# Patient Record
Sex: FEMALE | Race: WHITE | Employment: OTHER | URBAN - METROPOLITAN AREA
[De-identification: names, ages, dates, MRNs, and addresses within clinical notes are randomized per-mention and may not be internally consistent; named-entity substitution may affect disease eponyms.]

---

## 2021-01-17 ENCOUNTER — APPOINTMENT (OUTPATIENT)
Dept: GENERAL RADIOLOGY | Age: 86
DRG: 884 | End: 2021-01-17
Payer: MEDICARE

## 2021-01-17 ENCOUNTER — APPOINTMENT (OUTPATIENT)
Dept: CT IMAGING | Age: 86
DRG: 884 | End: 2021-01-17
Payer: MEDICARE

## 2021-01-17 ENCOUNTER — HOSPITAL ENCOUNTER (INPATIENT)
Age: 86
LOS: 2 days | Discharge: HOME HEALTH | DRG: 884 | End: 2021-01-19
Attending: INTERNAL MEDICINE | Admitting: INTERNAL MEDICINE
Payer: MEDICARE

## 2021-01-17 DIAGNOSIS — M25.512 ACUTE PAIN OF LEFT SHOULDER: ICD-10-CM

## 2021-01-17 DIAGNOSIS — R41.82 ALTERED MENTAL STATUS, UNSPECIFIED ALTERED MENTAL STATUS TYPE: Primary | ICD-10-CM
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
## 2021-01-17 DIAGNOSIS — R53.1 GENERAL WEAKNESS: ICD-10-CM

## 2021-01-17 PROBLEM — F32.5 MAJOR DEPRESSIVE DISORDER IN FULL REMISSION (HCC): Status: ACTIVE | Noted: 2019-08-14

## 2021-01-17 PROBLEM — R29.6 FREQUENT FALLS: Status: ACTIVE | Noted: 2021-01-17

## 2021-01-17 PROBLEM — F02.80 ALZHEIMER'S DEMENTIA WITHOUT BEHAVIORAL DISTURBANCE (HCC): Status: ACTIVE | Noted: 2019-08-14

## 2021-01-17 PROBLEM — I73.9 PVD (PERIPHERAL VASCULAR DISEASE) (HCC): Status: ACTIVE | Noted: 2021-01-17

## 2021-01-17 PROBLEM — G30.9 ALZHEIMER'S DEMENTIA WITHOUT BEHAVIORAL DISTURBANCE (HCC): Status: ACTIVE | Noted: 2019-08-14

## 2021-01-17 PROBLEM — I10 ESSENTIAL HYPERTENSION: Status: ACTIVE | Noted: 2017-03-10

## 2021-01-17 LAB
ABO/RH: NORMAL
ALBUMIN SERPL-MCNC: 4.3 G/DL (ref 3.5–4.6)
ALP BLD-CCNC: 92 U/L (ref 40–130)
ALT SERPL-CCNC: 16 U/L (ref 0–33)
ANION GAP SERPL CALCULATED.3IONS-SCNC: 13 MEQ/L (ref 9–15)
ANTIBODY SCREEN: NORMAL
AST SERPL-CCNC: 28 U/L (ref 0–35)
BACTERIA: NEGATIVE /HPF
BASOPHILS ABSOLUTE: 0 K/UL (ref 0–0.2)
BASOPHILS RELATIVE PERCENT: 0.3 %
BILIRUB SERPL-MCNC: 0.7 MG/DL (ref 0.2–0.7)
BILIRUBIN URINE: NEGATIVE
BLOOD, URINE: ABNORMAL
BUN BLDV-MCNC: 18 MG/DL (ref 8–23)
CALCIUM SERPL-MCNC: 9.6 MG/DL (ref 8.5–9.9)
CHLORIDE BLD-SCNC: 93 MEQ/L (ref 95–107)
CLARITY: CLEAR
CO2: 26 MEQ/L (ref 20–31)
COLOR: YELLOW
CREAT SERPL-MCNC: 0.85 MG/DL (ref 0.5–0.9)
EOSINOPHILS ABSOLUTE: 0 K/UL (ref 0–0.7)
EOSINOPHILS RELATIVE PERCENT: 0.5 %
EPITHELIAL CELLS, UA: ABNORMAL /HPF (ref 0–5)
GFR AFRICAN AMERICAN: >60
GFR NON-AFRICAN AMERICAN: >60
GLOBULIN: 2.8 G/DL (ref 2.3–3.5)
GLUCOSE BLD-MCNC: 142 MG/DL (ref 70–99)
GLUCOSE URINE: NEGATIVE MG/DL
HCT VFR BLD CALC: 41.8 % (ref 37–47)
HEMOGLOBIN: 13.9 G/DL (ref 12–16)
HYALINE CASTS: ABNORMAL /HPF (ref 0–5)
INR BLD: 0.9
KETONES, URINE: 15 MG/DL
LEUKOCYTE ESTERASE, URINE: NEGATIVE
LYMPHOCYTES ABSOLUTE: 0.9 K/UL (ref 1–4.8)
LYMPHOCYTES RELATIVE PERCENT: 9.7 %
MAGNESIUM: 2 MG/DL (ref 1.7–2.4)
MCH RBC QN AUTO: 32.7 PG (ref 27–31.3)
MCHC RBC AUTO-ENTMCNC: 33.3 % (ref 33–37)
MCV RBC AUTO: 98.2 FL (ref 82–100)
MONOCYTES ABSOLUTE: 0.5 K/UL (ref 0.2–0.8)
MONOCYTES RELATIVE PERCENT: 5.4 %
NEUTROPHILS ABSOLUTE: 7.6 K/UL (ref 1.4–6.5)
NEUTROPHILS RELATIVE PERCENT: 84.1 %
NITRITE, URINE: NEGATIVE
PDW BLD-RTO: 13.2 % (ref 11.5–14.5)
PH UA: 5.5 (ref 5–9)
PLATELET # BLD: 172 K/UL (ref 130–400)
POTASSIUM SERPL-SCNC: 4.8 MEQ/L (ref 3.4–4.9)
PRO-BNP: 405 PG/ML
PROTEIN UA: ABNORMAL MG/DL
PROTHROMBIN TIME: 11.8 SEC (ref 12.3–14.9)
RBC # BLD: 4.26 M/UL (ref 4.2–5.4)
RBC UA: ABNORMAL /HPF (ref 0–5)
SARS-COV-2, NAAT: NOT DETECTED
SODIUM BLD-SCNC: 132 MEQ/L (ref 135–144)
SPECIFIC GRAVITY UA: 1.04 (ref 1–1.03)
TOTAL CK: 164 U/L (ref 0–170)
TOTAL PROTEIN: 7.1 G/DL (ref 6.3–8)
TROPONIN: <0.01 NG/ML (ref 0–0.01)
URINE REFLEX TO CULTURE: ABNORMAL
UROBILINOGEN, URINE: 0.2 E.U./DL
WBC # BLD: 9 K/UL (ref 4.8–10.8)
WBC UA: ABNORMAL /HPF (ref 0–5)

## 2021-01-17 PROCEDURE — 82550 ASSAY OF CK (CPK): CPT

## 2021-01-17 PROCEDURE — 81001 URINALYSIS AUTO W/SCOPE: CPT

## 2021-01-17 PROCEDURE — 96376 TX/PRO/DX INJ SAME DRUG ADON: CPT

## 2021-01-17 PROCEDURE — 36415 COLL VENOUS BLD VENIPUNCTURE: CPT

## 2021-01-17 PROCEDURE — 86850 RBC ANTIBODY SCREEN: CPT

## 2021-01-17 PROCEDURE — 84484 ASSAY OF TROPONIN QUANT: CPT

## 2021-01-17 PROCEDURE — 86901 BLOOD TYPING SEROLOGIC RH(D): CPT

## 2021-01-17 PROCEDURE — 96375 TX/PRO/DX INJ NEW DRUG ADDON: CPT

## 2021-01-17 PROCEDURE — 80053 COMPREHEN METABOLIC PANEL: CPT

## 2021-01-17 PROCEDURE — 72125 CT NECK SPINE W/O DYE: CPT

## 2021-01-17 PROCEDURE — 83735 ASSAY OF MAGNESIUM: CPT

## 2021-01-17 PROCEDURE — 72128 CT CHEST SPINE W/O DYE: CPT

## 2021-01-17 PROCEDURE — 96374 THER/PROPH/DIAG INJ IV PUSH: CPT

## 2021-01-17 PROCEDURE — 6360000004 HC RX CONTRAST MEDICATION: Performed by: NURSE PRACTITIONER

## 2021-01-17 PROCEDURE — 73080 X-RAY EXAM OF ELBOW: CPT

## 2021-01-17 PROCEDURE — 86900 BLOOD TYPING SEROLOGIC ABO: CPT

## 2021-01-17 PROCEDURE — 1210000000 HC MED SURG R&B

## 2021-01-17 PROCEDURE — U0002 COVID-19 LAB TEST NON-CDC: HCPCS

## 2021-01-17 PROCEDURE — 85025 COMPLETE CBC W/AUTO DIFF WBC: CPT

## 2021-01-17 PROCEDURE — 93005 ELECTROCARDIOGRAM TRACING: CPT | Performed by: NURSE PRACTITIONER

## 2021-01-17 PROCEDURE — 85610 PROTHROMBIN TIME: CPT

## 2021-01-17 PROCEDURE — 2580000003 HC RX 258: Performed by: NURSE PRACTITIONER

## 2021-01-17 PROCEDURE — 73030 X-RAY EXAM OF SHOULDER: CPT

## 2021-01-17 PROCEDURE — 72131 CT LUMBAR SPINE W/O DYE: CPT

## 2021-01-17 PROCEDURE — 70450 CT HEAD/BRAIN W/O DYE: CPT

## 2021-01-17 PROCEDURE — 6360000002 HC RX W HCPCS: Performed by: NURSE PRACTITIONER

## 2021-01-17 PROCEDURE — 74177 CT ABD & PELVIS W/CONTRAST: CPT

## 2021-01-17 PROCEDURE — 83880 ASSAY OF NATRIURETIC PEPTIDE: CPT

## 2021-01-17 PROCEDURE — 71260 CT THORAX DX C+: CPT

## 2021-01-17 PROCEDURE — 99284 EMERGENCY DEPT VISIT MOD MDM: CPT

## 2021-01-17 PROCEDURE — 2500000003 HC RX 250 WO HCPCS: Performed by: NURSE PRACTITIONER

## 2021-01-17 RX ORDER — FENTANYL CITRATE 50 UG/ML
50 INJECTION, SOLUTION INTRAMUSCULAR; INTRAVENOUS
Status: COMPLETED | OUTPATIENT
Start: 2021-01-17 | End: 2021-01-17

## 2021-01-17 RX ORDER — LABETALOL HYDROCHLORIDE 5 MG/ML
10 INJECTION, SOLUTION INTRAVENOUS ONCE
Status: COMPLETED | OUTPATIENT
Start: 2021-01-17 | End: 2021-01-17

## 2021-01-17 RX ORDER — METOPROLOL TARTRATE 5 MG/5ML
5 INJECTION INTRAVENOUS ONCE
Status: DISCONTINUED | OUTPATIENT
Start: 2021-01-17 | End: 2021-01-17

## 2021-01-17 RX ORDER — ONDANSETRON 2 MG/ML
4 INJECTION INTRAMUSCULAR; INTRAVENOUS ONCE
Status: COMPLETED | OUTPATIENT
Start: 2021-01-17 | End: 2021-01-17

## 2021-01-17 RX ORDER — SODIUM CHLORIDE 9 MG/ML
INJECTION, SOLUTION INTRAVENOUS CONTINUOUS
Status: DISCONTINUED | OUTPATIENT
Start: 2021-01-17 | End: 2021-01-18

## 2021-01-17 RX ADMIN — IOPAMIDOL 100 ML: 612 INJECTION, SOLUTION INTRAVENOUS at 20:23

## 2021-01-17 RX ADMIN — FENTANYL CITRATE 50 MCG: 50 INJECTION, SOLUTION INTRAMUSCULAR; INTRAVENOUS at 20:20

## 2021-01-17 RX ADMIN — LABETALOL HYDROCHLORIDE 10 MG: 5 INJECTION, SOLUTION INTRAVENOUS at 21:56

## 2021-01-17 RX ADMIN — LABETALOL HYDROCHLORIDE 10 MG: 5 INJECTION, SOLUTION INTRAVENOUS at 21:17

## 2021-01-17 RX ADMIN — FENTANYL CITRATE 50 MCG: 50 INJECTION, SOLUTION INTRAMUSCULAR; INTRAVENOUS at 22:24

## 2021-01-17 RX ADMIN — ONDANSETRON 4 MG: 2 INJECTION INTRAMUSCULAR; INTRAVENOUS at 18:42

## 2021-01-17 RX ADMIN — FENTANYL CITRATE 50 MCG: 50 INJECTION, SOLUTION INTRAMUSCULAR; INTRAVENOUS at 19:30

## 2021-01-17 RX ADMIN — FENTANYL CITRATE 50 MCG: 50 INJECTION, SOLUTION INTRAMUSCULAR; INTRAVENOUS at 23:02

## 2021-01-17 RX ADMIN — FENTANYL CITRATE 50 MCG: 50 INJECTION, SOLUTION INTRAMUSCULAR; INTRAVENOUS at 18:42

## 2021-01-17 RX ADMIN — SODIUM CHLORIDE: 9 INJECTION, SOLUTION INTRAVENOUS at 18:42

## 2021-01-17 RX ADMIN — FENTANYL CITRATE 50 MCG: 50 INJECTION, SOLUTION INTRAMUSCULAR; INTRAVENOUS at 21:18

## 2021-01-17 ASSESSMENT — PAIN SCALES - GENERAL
PAINLEVEL_OUTOF10: 9
PAINLEVEL_OUTOF10: 8
PAINLEVEL_OUTOF10: 9
PAINLEVEL_OUTOF10: 10

## 2021-01-17 ASSESSMENT — ENCOUNTER SYMPTOMS
RHINORRHEA: 0
CHEST TIGHTNESS: 0
EYE REDNESS: 0
SINUS PRESSURE: 0
DIARRHEA: 0
NAUSEA: 0
PHOTOPHOBIA: 0
WHEEZING: 0
VOMITING: 0
BACK PAIN: 0
COUGH: 0
SINUS PAIN: 0
TROUBLE SWALLOWING: 0
SHORTNESS OF BREATH: 0
SORE THROAT: 0
COLOR CHANGE: 0
ABDOMINAL PAIN: 0
EYE PAIN: 0

## 2021-01-17 ASSESSMENT — PAIN DESCRIPTION - ORIENTATION: ORIENTATION: LEFT

## 2021-01-17 NOTE — ED TRIAGE NOTES
PT ALERT AND FORGETFUL. PT IS ON ra NO DISTRESS NOTED. PT C/O PAIN 10/10 IN LEFT SHOULDER.  PT FELL AT HOME AND NOT SURE IF SHE HAD loc

## 2021-01-17 NOTE — ED PROVIDER NOTES
Guanakito Gregorio is a 80 y.o. female who presents to the emergency department for complaint of unwitnessed fall with unknown loss of consciousness or downtime. Patient is complaining of left-sided shoulder upper arm and left-sided chest pain. She states that she does not know how she fell and is not sure even if she did fall. She does not recall a short portion of the afternoon she does not know how she injured her arm. Her daughter is bedside and states that her mother contacted the granddaughter but hung up and was able to get additional formation. They drove to the house and found her able to stand but complained of severe left arm pain appeared to be slightly confused. Daughter states she does have a history of some Alzheimer's dementia but is generally able to care for herself. She does admit that she is had a history of other multiple falls and TIAs in the past.  She states that she appears to be at her baseline level of orientation when she found her but was concerned due to the obvious discomfort. The patient rates the pain in the left arm 10 out of 10 sharp aching stabbing made worse with any pressure to the arm or attempts to move her arm. She states she does have some increased pain with deep breathing on the left side of the chest.  She denies any head or neck pain at this time denies any midline back pain denies abdominal pain. She denies any recent illnesses. She denies any urinary symptoms of urgency frequency or dysuria. She states that she is uncertain if she took any of her medications and is not sure what time the date is. She states she generally takes them in early afternoon but cannot confirm that she is taking any of her medications today. Her daughter states that she believes she is on Plavix. Nursing Notes were reviewed.     REVIEW OF SYSTEMS    (2-9 systems for level 4, 10 or more for level 5)     Review of Systems Constitutional: Negative for activity change, appetite change, chills, diaphoresis, fatigue and fever. HENT: Negative for congestion, ear pain, postnasal drip, rhinorrhea, sinus pressure, sinus pain, sore throat and trouble swallowing. Eyes: Negative for photophobia, pain, redness and visual disturbance. Respiratory: Negative for cough, chest tightness, shortness of breath and wheezing. Cardiovascular: Positive for chest pain. Negative for palpitations and leg swelling. Gastrointestinal: Negative for abdominal pain, diarrhea, nausea and vomiting. Genitourinary: Negative for difficulty urinating, dysuria, flank pain, frequency and urgency. Musculoskeletal: Positive for arthralgias and myalgias. Negative for back pain, gait problem, joint swelling, neck pain and neck stiffness. Skin: Negative for color change and rash. Neurological: Negative for dizziness, tremors, seizures, syncope, speech difficulty, weakness, light-headedness, numbness and headaches. Except as noted above the remainder of the review of systems was reviewed and negative. PAST MEDICAL HISTORY     Past Medical History:   Diagnosis Date    Hypertension     TIA (transient ischemic attack)     Uterine cancer ([facility] [address]      Past Surgical History:   Procedure Laterality Date    FRACTURE SURGERY      JOINT REPLACEMENT       Social History     Socioeconomic History    Marital status:       Spouse name: None    Number of children: None    Years of education: None    Highest education level: None   Occupational History    None   Social Needs    Financial resource strain: None    Food insecurity     Worry: None     Inability: None    Transportation needs     Medical: None     Non-medical: None   Tobacco Use    Smoking status: Never Smoker    Smokeless tobacco: Never Used   Substance and Sexual Activity    Alcohol use: Not Currently    Drug use: Not Currently    Sexual activity: Not Currently   Lifestyle General: Bowel sounds are normal. There is no distension. Palpations: Abdomen is soft. There is no mass. Tenderness: There is no abdominal tenderness. There is no guarding or rebound. Hernia: No hernia is present. Musculoskeletal:         General: Swelling, tenderness and signs of injury present. No deformity. Right shoulder: Normal.      Left shoulder: She exhibits decreased range of motion, tenderness, bony tenderness, pain, spasm and decreased strength. She exhibits no swelling and no effusion. Right elbow: Normal.     Left elbow: Normal.      Right wrist: Normal.      Left wrist: Normal.      Right hip: Normal.      Left hip: Normal.      Right knee: Normal.      Left knee: Normal.      Cervical back: Normal.      Thoracic back: Normal.      Lumbar back: Normal.      Right hand: Normal.      Left hand: Normal.   Skin:     General: Skin is warm and dry. Neurological:      General: No focal deficit present. Mental Status: She is alert and oriented to person, place, and time. Mental status is at baseline. Cranial Nerves: No cranial nerve deficit. Sensory: No sensory deficit. Motor: No weakness.       Coordination: Coordination normal.         RESULTS     EKG: All EKG's are interpreted by the Emergency Department Physician who either signs or Co-signsthis chart in the absence of a cardiologist.    Sinus rhythm with PACs aberrant conduction left axis deviation 96 bpm no apparent ST elevation  ms    RADIOLOGY:   Non-plain filmimages such as CT, Ultrasound and MRI are read by the radiologist. Plain radiographic images are visualized and preliminarily interpreted by the emergency physician with the below findings:    2 view x-ray of the left shoulder 4 view x-ray of the left elbow showed no acute fracture displacement or obvious bony process    Interpretation per the Radiologist below, if available at the time ofthis note:    [address]   Final Result NO ACUTE INTRA-AXIAL OR EXTRA-AXIAL FINDINGS. All CT scans at this facility use dose modulation, iterative reconstruction, and/or weight based dosing when appropriate to reduce radiation dose to as low as reasonably achievable. CT CERVICAL SPINE WO CONTRAST   Final Result      CT THORACIC SPINE WO CONTRAST   Final Result      NO ACUTE FRACTURES. All CT scans at this facility use dose modulation, iterative reconstruction, and/or weight based dosing when appropriate to reduce radiation dose to as low as reasonably achievable. Examination: CT scan lumbar spine      CLINICAL DATA: Pain, fall      TECHNIQUE: Multiple serial axial images of the lumbar spine from the lower thoracic through the sacral/coccygeal levels with both sagittal coronal reconstruction was performed      FINDINGS:   There are 5 lumbar type vertebra. Is a diffuse generalized osteopenia. The disc spaces are narrowed at the L1-2 and L5-S1 disc space. .   There is a grade 1 anterolisthesis of L4 and L5. Gualberto Kick There is multilevel degenerative changes of posterior facets of lower lumbar spine      The SI joints are intact. Approximately 20% loss of height of the L2 vertebra. Likely old   No new acute fractures. IMPRESSION:      NO ACUTE FRACTURES   GRADE 1 ANTEROLISTHESIS OF L4 on L5      CT LUMBAR SPINE WO CONTRAST   Final Result      NO ACUTE FRACTURES. All CT scans at this facility use dose modulation, iterative reconstruction, and/or weight based dosing when appropriate to reduce radiation dose to as low as reasonably achievable. Examination: CT scan lumbar spine      CLINICAL DATA: Pain, fall      TECHNIQUE: Multiple serial axial images of the lumbar spine from the lower thoracic through the sacral/coccygeal levels with both sagittal coronal reconstruction was performed      FINDINGS:   There are 5 lumbar type vertebra. Is a diffuse generalized osteopenia. The disc spaces are narrowed at the L1-2 and L5-S1 disc space. .   There is a grade 1 anterolisthesis of L4 and L5. Lesle Gulling There is multilevel degenerative changes of posterior facets of lower lumbar spine      The SI joints are intact. Approximately 20% loss of height of the L2 vertebra. Likely old   No new acute fractures. IMPRESSION:      NO ACUTE FRACTURES   GRADE 1 ANTEROLISTHESIS OF L4 on L5      CT CHEST W CONTRAST   Final Result   UNREMARKABLE CT SCAN THE CHEST AS DESCRIBED ABOVE         All CT scans at this facility use dose modulation, iterative reconstruction, and/or weight based dosing when appropriate to reduce radiation dose to as low as reasonably achievable. Examination: CT CHEST W CONTRAST      Indication:   Unwitnessed fall with unknown loss of consciousness, left shoulder pain discomfort, left chest pain near shoulder       Technique: Multiple serial axial images was performed through the abdomen and pelvis without intravenous or oral administration of contrast Images were reconstructed in the axial and coronal and sagittal planes. Comparison:  No comparison is available. Findings: The liver, gallbladder, spleen, pancreas, adrenals, are unremarkable. The right kidney shows no significant perinephric stranding. No hydronephrosis. The left kidney is atrophic. There is an area of low-attenuation at the inferior pole too small to characterize. No hydronephrosis. No bladder calculi. Large and small bowel show no sign of obstruction. The appendix is visualized. No periappendiceal stranding. No diverticulitis. There is a surgically absent. No free air. No free fluid. There is a infrarenal abdominal aortic aneurysm measuring 3.5 x 3.2 cm. No significant retroperitoneal adenopathy. There Is a diffuse generalized osteopenia as well as loss of height of approximately 20% of the L2 vertebra. No acute fractures. Note is made of grade 1 anterolisthesis of L4 and L5. The SI joints are intact. Visualized portions of the left and right hips are intact      Incidental note is made of a tubular structure in between the thighs the perineum. Correlate with patient history. .      IMPRESSION:   1. THERE IS A INFRARENAL ABDOMINAL AORTIC ANEURYSM.   2 OTHER FINDINGS DETAILED ABOVE         All CT scans at this facility use dose modulation, iterative reconstruction, and/or weight based dosing when appropriate to reduce radiation dose to as low as reasonably achievable.       CT ABDOMEN PELVIS W IV CONTRAST Additional Contrast? None    (Results Pending)   XR SHOULDER LEFT (MIN 2 VIEWS)    (Results Pending)   XR ELBOW LEFT (MIN 3 VIEWS)    (Results Pending)         ED BEDSIDE ULTRASOUND:   Performed by ED Physician - none    LABS:  Labs Reviewed   PROTIME-INR - Abnormal; Notable for the following components:       Result Value    Protime 11.8 (*)     All other components within normal limits   URINE RT REFLEX TO CULTURE - Abnormal; Notable for the following components:    Ketones, Urine 15 (*)     Blood, Urine TRACE (*)     Protein, UA TRACE (*)     All other components within normal limits   CBC WITH AUTO DIFFERENTIAL - Abnormal; Notable for the following components:    MCH 32.7 (*)     Neutrophils Absolute 7.6 (*)     Lymphocytes Absolute 0.9 (*)     All other components within normal limits   COMPREHENSIVE METABOLIC PANEL - Abnormal; Notable for the following components:    Sodium 132 (*)     Chloride 93 (*)     Glucose 142 (*)     All other components within normal limits   MICROSCOPIC URINALYSIS - Abnormal; Notable for the following components:    WBC, UA 6-9 (*)     RBC, UA 6-10 (*)     All other components within normal limits   TROPONIN   CK   MAGNESIUM   BRAIN NATRIURETIC PEPTIDE   COVID-19 TYPE AND SCREEN       All other labs were within normal range or not returned as of this dictation.     EMERGENCY DEPARTMENT COURSE and DIFFERENTIAL DIAGNOSIS/MDM:   Vitals:    Vitals:    01/17/21 1757 01/17/21 1800 01/17/21 1930 01/17/21 2130   BP: (!) 210/101 (!) 205/90 (!) 204/80 (!) 198/96   Pulse: 88  85 95   Resp: 19      Temp: 97.6 °F (36.4 °C)      SpO2: 100% 98% 94% 93%   Weight: 175 lb (79.4 kg)      Height: 5' (1.524 m) MDM patient arrives afebrile nontoxic obvious discomfort of the left shoulder. Of the shoulder with elevated blood pressure on arrival.  Patient has seemly not been taking any of her medications at home and per the daughter she does not frequently miss her medications and still lives alone without any assistance in her home other than family visiting her regularly. Due to the left shoulder pain and unwitnessed conditions patient was made a category 2 trauma for suspicion of fall with injuries age greater than 72. Pan scan CT shows only an infrarenal abdominal aortic aneurysm with no previous mention of this there are no other new acute findings fractures or traumatic injuries. The x-rays do not show any fracture of the left arm or shoulder. Patient continues have pain and discomfort this area. She was medicated for her blood pressure with labetalol IV. Lab work is grossly markable nondiagnostic she does not appear to have any changes in cardiac enzymes EKG is normal there is no obvious infectious process. Patient's only continued complaint is of the left shoulder plain and was given medication for this with more support. However due to the patient's altered mental status per family report history of Alzheimer's dementia with worsening mental status per the daughter and concern for fall with injury decision made to admit patient for further treatment and evaluation. Per the daughter the patient is a DNR CCA she is the medical power of . Patient is agreeable to admission. Hospitalist has accept admissions patient for further treatment evaluation. CRITICAL CARE TIME       CONSULTS:  None    PROCEDURES:  Unless otherwise noted below, none     Procedures    FINAL IMPRESSION      1. Altered mental status, unspecified altered mental status type    2. Fall, initial encounter    3. Acute pain of left shoulder    4.  General weakness          DISPOSITION/PLAN   DISPOSITION        PATIENT REFERRED TO:

## 2021-01-18 PROBLEM — E87.1 HYPONATREMIA: Status: ACTIVE | Noted: 2021-01-18

## 2021-01-18 LAB
ALBUMIN SERPL-MCNC: 3.6 G/DL (ref 3.5–4.6)
ALP BLD-CCNC: 66 U/L (ref 40–130)
ALT SERPL-CCNC: 12 U/L (ref 0–33)
ANION GAP SERPL CALCULATED.3IONS-SCNC: 12 MEQ/L (ref 9–15)
AST SERPL-CCNC: 17 U/L (ref 0–35)
BILIRUB SERPL-MCNC: 0.6 MG/DL (ref 0.2–0.7)
BUN BLDV-MCNC: 18 MG/DL (ref 8–23)
CALCIUM SERPL-MCNC: 8.8 MG/DL (ref 8.5–9.9)
CHLORIDE BLD-SCNC: 97 MEQ/L (ref 95–107)
CO2: 25 MEQ/L (ref 20–31)
CREAT SERPL-MCNC: 0.94 MG/DL (ref 0.5–0.9)
EKG ATRIAL RATE: 86 BPM
EKG ATRIAL RATE: 96 BPM
EKG P AXIS: 56 DEGREES
EKG P-R INTERVAL: 160 MS
EKG P-R INTERVAL: 222 MS
EKG Q-T INTERVAL: 384 MS
EKG Q-T INTERVAL: 414 MS
EKG QRS DURATION: 86 MS
EKG QRS DURATION: 96 MS
EKG QTC CALCULATION (BAZETT): 485 MS
EKG QTC CALCULATION (BAZETT): 495 MS
EKG R AXIS: -54 DEGREES
EKG R AXIS: -57 DEGREES
EKG T AXIS: 65 DEGREES
EKG T AXIS: 76 DEGREES
EKG VENTRICULAR RATE: 86 BPM
EKG VENTRICULAR RATE: 96 BPM
GFR AFRICAN AMERICAN: >60
GFR NON-AFRICAN AMERICAN: 55.9
GLOBULIN: 2.3 G/DL (ref 2.3–3.5)
GLUCOSE BLD-MCNC: 164 MG/DL (ref 70–99)
HCT VFR BLD CALC: 37.2 % (ref 37–47)
HEMOGLOBIN: 12.3 G/DL (ref 12–16)
MCH RBC QN AUTO: 33 PG (ref 27–31.3)
MCHC RBC AUTO-ENTMCNC: 33.1 % (ref 33–37)
MCV RBC AUTO: 99.7 FL (ref 82–100)
PDW BLD-RTO: 13.3 % (ref 11.5–14.5)
PLATELET # BLD: 157 K/UL (ref 130–400)
POTASSIUM REFLEX MAGNESIUM: 4.1 MEQ/L (ref 3.4–4.9)
RBC # BLD: 3.73 M/UL (ref 4.2–5.4)
SODIUM BLD-SCNC: 134 MEQ/L (ref 135–144)
TOTAL PROTEIN: 5.9 G/DL (ref 6.3–8)
TROPONIN: <0.01 NG/ML (ref 0–0.01)
TROPONIN: <0.01 NG/ML (ref 0–0.01)
WBC # BLD: 9.1 K/UL (ref 4.8–10.8)

## 2021-01-18 PROCEDURE — 6370000000 HC RX 637 (ALT 250 FOR IP): Performed by: INTERNAL MEDICINE

## 2021-01-18 PROCEDURE — 93005 ELECTROCARDIOGRAM TRACING: CPT | Performed by: NURSE PRACTITIONER

## 2021-01-18 PROCEDURE — 36415 COLL VENOUS BLD VENIPUNCTURE: CPT

## 2021-01-18 PROCEDURE — 6360000002 HC RX W HCPCS: Performed by: NURSE PRACTITIONER

## 2021-01-18 PROCEDURE — 2580000003 HC RX 258: Performed by: NURSE PRACTITIONER

## 2021-01-18 PROCEDURE — 6370000000 HC RX 637 (ALT 250 FOR IP): Performed by: NURSE PRACTITIONER

## 2021-01-18 PROCEDURE — 80053 COMPREHEN METABOLIC PANEL: CPT

## 2021-01-18 PROCEDURE — 2500000003 HC RX 250 WO HCPCS: Performed by: NURSE PRACTITIONER

## 2021-01-18 PROCEDURE — 93010 ELECTROCARDIOGRAM REPORT: CPT | Performed by: INTERNAL MEDICINE

## 2021-01-18 PROCEDURE — 85027 COMPLETE CBC AUTOMATED: CPT

## 2021-01-18 PROCEDURE — 99221 1ST HOSP IP/OBS SF/LOW 40: CPT | Performed by: ORTHOPAEDIC SURGERY

## 2021-01-18 PROCEDURE — 1210000000 HC MED SURG R&B

## 2021-01-18 PROCEDURE — 84484 ASSAY OF TROPONIN QUANT: CPT

## 2021-01-18 PROCEDURE — 97166 OT EVAL MOD COMPLEX 45 MIN: CPT

## 2021-01-18 RX ORDER — CITALOPRAM 10 MG/1
20 TABLET ORAL DAILY
Status: DISCONTINUED | OUTPATIENT
Start: 2021-01-18 | End: 2021-01-19 | Stop reason: HOSPADM

## 2021-01-18 RX ORDER — SIMVASTATIN 20 MG
20 TABLET ORAL NIGHTLY
COMMUNITY

## 2021-01-18 RX ORDER — HYDRALAZINE HYDROCHLORIDE 25 MG/1
25 TABLET, FILM COATED ORAL 2 TIMES DAILY
COMMUNITY

## 2021-01-18 RX ORDER — PROMETHAZINE HYDROCHLORIDE 12.5 MG/1
12.5 TABLET ORAL EVERY 6 HOURS PRN
Status: DISCONTINUED | OUTPATIENT
Start: 2021-01-18 | End: 2021-01-19 | Stop reason: HOSPADM

## 2021-01-18 RX ORDER — METOPROLOL TARTRATE AND HYDROCHLOROTHIAZIDE 50; 25 MG/1; MG/1
1 TABLET ORAL 2 TIMES DAILY
Status: ON HOLD | COMMUNITY
End: 2021-01-18

## 2021-01-18 RX ORDER — SODIUM CHLORIDE 0.9 % (FLUSH) 0.9 %
10 SYRINGE (ML) INJECTION PRN
Status: DISCONTINUED | OUTPATIENT
Start: 2021-01-18 | End: 2021-01-19 | Stop reason: HOSPADM

## 2021-01-18 RX ORDER — HYDRALAZINE HYDROCHLORIDE 25 MG/1
25 TABLET, FILM COATED ORAL 2 TIMES DAILY
Status: DISCONTINUED | OUTPATIENT
Start: 2021-01-18 | End: 2021-01-19 | Stop reason: HOSPADM

## 2021-01-18 RX ORDER — POLYETHYLENE GLYCOL 3350 17 G/17G
17 POWDER, FOR SOLUTION ORAL DAILY PRN
Status: DISCONTINUED | OUTPATIENT
Start: 2021-01-18 | End: 2021-01-19 | Stop reason: HOSPADM

## 2021-01-18 RX ORDER — ACETAMINOPHEN 650 MG/1
650 SUPPOSITORY RECTAL EVERY 6 HOURS PRN
Status: DISCONTINUED | OUTPATIENT
Start: 2021-01-18 | End: 2021-01-19 | Stop reason: HOSPADM

## 2021-01-18 RX ORDER — HYDROCHLOROTHIAZIDE 25 MG/1
25 TABLET ORAL DAILY
COMMUNITY
Start: 2020-07-21

## 2021-01-18 RX ORDER — CLOPIDOGREL BISULFATE 75 MG/1
75 TABLET ORAL DAILY
COMMUNITY

## 2021-01-18 RX ORDER — METOPROLOL TARTRATE 50 MG/1
TABLET, FILM COATED ORAL
COMMUNITY
Start: 2020-07-21

## 2021-01-18 RX ORDER — LANOLIN ALCOHOL/MO/W.PET/CERES
3 CREAM (GRAM) TOPICAL NIGHTLY PRN
Status: DISCONTINUED | OUTPATIENT
Start: 2021-01-18 | End: 2021-01-19 | Stop reason: HOSPADM

## 2021-01-18 RX ORDER — CITALOPRAM 20 MG/1
20 TABLET ORAL DAILY
COMMUNITY

## 2021-01-18 RX ORDER — LABETALOL HYDROCHLORIDE 5 MG/ML
10 INJECTION, SOLUTION INTRAVENOUS EVERY 4 HOURS PRN
Status: DISCONTINUED | OUTPATIENT
Start: 2021-01-18 | End: 2021-01-19 | Stop reason: HOSPADM

## 2021-01-18 RX ORDER — HYDROCHLOROTHIAZIDE 25 MG/1
25 TABLET ORAL DAILY
Status: DISCONTINUED | OUTPATIENT
Start: 2021-01-18 | End: 2021-01-19 | Stop reason: HOSPADM

## 2021-01-18 RX ORDER — ONDANSETRON 2 MG/ML
4 INJECTION INTRAMUSCULAR; INTRAVENOUS EVERY 6 HOURS PRN
Status: DISCONTINUED | OUTPATIENT
Start: 2021-01-18 | End: 2021-01-19 | Stop reason: HOSPADM

## 2021-01-18 RX ORDER — ACETAMINOPHEN 325 MG/1
650 TABLET ORAL EVERY 6 HOURS PRN
Status: DISCONTINUED | OUTPATIENT
Start: 2021-01-18 | End: 2021-01-19 | Stop reason: HOSPADM

## 2021-01-18 RX ORDER — LANOLIN ALCOHOL/MO/W.PET/CERES
3 CREAM (GRAM) TOPICAL ONCE
Status: COMPLETED | OUTPATIENT
Start: 2021-01-18 | End: 2021-01-18

## 2021-01-18 RX ORDER — KETOROLAC TROMETHAMINE 15 MG/ML
15 INJECTION, SOLUTION INTRAMUSCULAR; INTRAVENOUS EVERY 6 HOURS PRN
Status: DISCONTINUED | OUTPATIENT
Start: 2021-01-18 | End: 2021-01-19 | Stop reason: HOSPADM

## 2021-01-18 RX ORDER — ATORVASTATIN CALCIUM 10 MG/1
10 TABLET, FILM COATED ORAL DAILY
Status: DISCONTINUED | OUTPATIENT
Start: 2021-01-18 | End: 2021-01-19 | Stop reason: HOSPADM

## 2021-01-18 RX ORDER — SODIUM CHLORIDE 0.9 % (FLUSH) 0.9 %
10 SYRINGE (ML) INJECTION EVERY 12 HOURS SCHEDULED
Status: DISCONTINUED | OUTPATIENT
Start: 2021-01-18 | End: 2021-01-19 | Stop reason: HOSPADM

## 2021-01-18 RX ORDER — CLOPIDOGREL BISULFATE 75 MG/1
75 TABLET ORAL DAILY
Status: DISCONTINUED | OUTPATIENT
Start: 2021-01-18 | End: 2021-01-19 | Stop reason: HOSPADM

## 2021-01-18 RX ORDER — POTASSIUM CHLORIDE 20 MEQ/1
20 TABLET, EXTENDED RELEASE ORAL DAILY
COMMUNITY

## 2021-01-18 RX ADMIN — ATORVASTATIN CALCIUM 10 MG: 10 TABLET, FILM COATED ORAL at 18:59

## 2021-01-18 RX ADMIN — ACETAMINOPHEN 650 MG: 325 TABLET, FILM COATED ORAL at 01:58

## 2021-01-18 RX ADMIN — LABETALOL HYDROCHLORIDE 10 MG: 5 INJECTION, SOLUTION INTRAVENOUS at 00:30

## 2021-01-18 RX ADMIN — HYDROCHLOROTHIAZIDE 25 MG: 25 TABLET ORAL at 18:59

## 2021-01-18 RX ADMIN — ENOXAPARIN SODIUM 40 MG: 40 INJECTION SUBCUTANEOUS at 12:55

## 2021-01-18 RX ADMIN — KETOROLAC TROMETHAMINE 15 MG: 15 INJECTION, SOLUTION INTRAMUSCULAR; INTRAVENOUS at 21:06

## 2021-01-18 RX ADMIN — CLOPIDOGREL BISULFATE 75 MG: 75 TABLET ORAL at 18:59

## 2021-01-18 RX ADMIN — SODIUM CHLORIDE: 9 INJECTION, SOLUTION INTRAVENOUS at 00:30

## 2021-01-18 RX ADMIN — Medication 3 MG: at 21:06

## 2021-01-18 RX ADMIN — CITALOPRAM HYDROBROMIDE 20 MG: 10 TABLET ORAL at 18:59

## 2021-01-18 RX ADMIN — HYDRALAZINE HYDROCHLORIDE 25 MG: 25 TABLET, FILM COATED ORAL at 21:01

## 2021-01-18 RX ADMIN — SODIUM CHLORIDE, PRESERVATIVE FREE 10 ML: 5 INJECTION INTRAVENOUS at 21:02

## 2021-01-18 RX ADMIN — Medication 3 MG: at 01:09

## 2021-01-18 RX ADMIN — METOPROLOL TARTRATE 25 MG: 25 TABLET, FILM COATED ORAL at 21:01

## 2021-01-18 RX ADMIN — SODIUM CHLORIDE: 9 INJECTION, SOLUTION INTRAVENOUS at 12:56

## 2021-01-18 RX ADMIN — KETOROLAC TROMETHAMINE 15 MG: 15 INJECTION, SOLUTION INTRAMUSCULAR; INTRAVENOUS at 00:30

## 2021-01-18 ASSESSMENT — PAIN SCALES - GENERAL
PAINLEVEL_OUTOF10: 10
PAINLEVEL_OUTOF10: 10
PAINLEVEL_OUTOF10: 0

## 2021-01-18 ASSESSMENT — ENCOUNTER SYMPTOMS
ALLERGIC/IMMUNOLOGIC NEGATIVE: 1
VOMITING: 0
WHEEZING: 0
RHINORRHEA: 0
NAUSEA: 0
EYES NEGATIVE: 1
SHORTNESS OF BREATH: 0
BACK PAIN: 0
PHOTOPHOBIA: 0
ABDOMINAL PAIN: 0
SORE THROAT: 0

## 2021-01-18 ASSESSMENT — PAIN DESCRIPTION - ORIENTATION: ORIENTATION: LEFT

## 2021-01-18 ASSESSMENT — PAIN DESCRIPTION - LOCATION: LOCATION: ARM

## 2021-01-18 NOTE — H&P
[facility] MEDICINE    HISTORY AND PHYSICAL EXAM    PATIENT NAME:  Roula Frost    MRN:  [id number]  SERVICE DATE:  1/18/2021   SERVICE TIME:  1:26 AM    Primary Care Physician: No primary care provider on file. SUBJECTIVE  CHIEF COMPLAINT: Fall    HPI:   Next fall at home. Patient is an alert and oriented x89-year-old female. Patient was found at home on the ground by her family with an injury to her left shoulder. Patient states that she does not recall falling today. She appears frustrated and does not remember much of the day. Family reported to the ER staff that patient has a history of frequent falls. Patient lives alone however her family checks on her regularly. Patient has no obvious head injury and cannot state whether she had loss of consciousness. It is also unclear as to how long she was down. Patient states she has pain in her left shoulder and left chest which is clearly reproducible on palpation and with movement. Patient does not rate the pain but simply states \"it hurts really bad\". Patient reports that she takes care of getting dressed by herself, making her own meals, and taking care of other ADLs. Patient denies any tobacco use but states she has a drink of vodka every evening. Patient denies any marijuana recreational drug use. When questioned patient over her medications and her past medical history. Patient just replies that she takes so many medications and she does not know what they are for. PAST MEDICAL HISTORY:    Past Medical History:   Diagnosis Date    Hypertension     TIA (transient ischemic attack)     Uterine cancer ([facility] [address]      PAST SURGICAL HISTORY:    Past Surgical History:   Procedure Laterality Date    FRACTURE SURGERY      JOINT REPLACEMENT       FAMILY HISTORY:  History reviewed. No pertinent family history. SOCIAL HISTORY:    Social History     Socioeconomic History    Marital status:       Spouse name: Not on file  Number of children: Not on file    Years of education: Not on file    Highest education level: Not on file   Occupational History    Not on file   Social Needs    Financial resource strain: Not on file    Food insecurity     Worry: Not on file     Inability: Not on file    Transportation needs     Medical: Not on file     Non-medical: Not on file   Tobacco Use    Smoking status: Never Smoker    Smokeless tobacco: Never Used   Substance and Sexual Activity    Alcohol use: Not Currently    Drug use: Not Currently    Sexual activity: Not Currently   Lifestyle    Physical activity     Days per week: Not on file     Minutes per session: Not on file    Stress: Not on file   Relationships    Social connections     Talks on phone: Not on file     Gets together: Not on file     Attends Voodoo service: Not on file     Active member of club or organization: Not on file     Attends meetings of clubs or organizations: Not on file     Relationship status: Not on file    Intimate partner violence     Fear of current or ex partner: Not on file     Emotionally abused: Not on file     Physically abused: Not on file     Forced sexual activity: Not on file   Other Topics Concern    Not on file   Social History Narrative    Not on file     MEDICATIONS:   Prior to Admission medications    Medication Sig Start Date End Date Taking?  Authorizing Provider   citalopram (CELEXA) 20 MG tablet Take 20 mg by mouth daily   Yes Historical Provider, MD   clopidogrel (PLAVIX) 75 MG tablet Take 75 mg by mouth daily   Yes Historical Provider, MD   hydrALAZINE (APRESOLINE) 25 MG tablet Take 25 mg by mouth 2 times daily 50 MG in AM, 25 MG in PM   Yes Historical Provider, MD   metoprolol-hydroCHLOROthiazide (LOPRESSOR HCT) 50-25 MG per tablet Take 1 tablet by mouth 2 times daily 1/2 tablet in AM, 1 tablet in PM   Yes Historical Provider, MD potassium chloride (KLOR-CON M) 20 MEQ extended release tablet Take 20 mEq by mouth daily   Yes Historical Provider, MD   simvastatin (ZOCOR) 20 MG tablet Take 20 mg by mouth nightly   Yes Historical Provider, MD       ALLERGIES: Patient has no known allergies. REVIEW OF SYSTEM:   Review of Systems   Constitutional: Negative for appetite change, fatigue, fever and unexpected weight change. HENT: Negative for congestion, rhinorrhea and sore throat. Eyes: Negative. Negative for photophobia and visual disturbance. Respiratory: Negative for shortness of breath and wheezing. Cardiovascular: Negative for chest pain. Gastrointestinal: Negative for abdominal pain, nausea and vomiting. Endocrine: Negative. Negative for polydipsia, polyphagia and polyuria. Genitourinary: Negative for difficulty urinating, dysuria and pelvic pain. Musculoskeletal: Positive for gait problem and myalgias. Negative for back pain. Skin: Negative. Negative for rash. Allergic/Immunologic: Negative. Neurological: Negative for dizziness, speech difficulty and weakness. Hematological: Negative. Psychiatric/Behavioral: Positive for confusion. Negative for behavioral problems. OBJECTIVE  PHYSICAL EXAM: BP (!) 184/103   Pulse 94   Temp 97.6 °F (36.4 °C)   Resp 19   Ht 5' (1.524 m)   Wt 175 lb (79.4 kg)   SpO2 97%   BMI 34.18 kg/m²     Physical Exam  Vitals signs and nursing note reviewed. Constitutional:       General: She is not in acute distress. Appearance: She is well-developed. HENT:      Right Ear: External ear normal.      Left Ear: External ear normal.      Nose: Nose normal.   Eyes:      Pupils: Pupils are equal, round, and reactive to light. Neck:      Musculoskeletal: Normal range of motion. Cardiovascular:      Rate and Rhythm: Normal rate and regular rhythm. Pulmonary:      Effort: Pulmonary effort is normal. No respiratory distress. Result Value Ref Range    WBC 9.0 4.8 - 10.8 K/uL    RBC 4.26 4.20 - 5.40 M/uL    Hemoglobin 13.9 12.0 - 16.0 g/dL    Hematocrit 41.8 37.0 - 47.0 %    MCV 98.2 82.0 - 100.0 fL    MCH 32.7 (H) 27.0 - 31.3 pg    MCHC 33.3 33.0 - 37.0 %    RDW 13.2 11.5 - 14.5 %    Platelets 300 092 - 135 K/uL    Neutrophils % 84.1 %    Lymphocytes % 9.7 %    Monocytes % 5.4 %    Eosinophils % 0.5 %    Basophils % 0.3 %    Neutrophils Absolute 7.6 (H) 1.4 - 6.5 K/uL    Lymphocytes Absolute 0.9 (L) 1.0 - 4.8 K/uL    Monocytes Absolute 0.5 0.2 - 0.8 K/uL    Eosinophils Absolute 0.0 0.0 - 0.7 K/uL    Basophils Absolute 0.0 0.0 - 0.2 K/uL   Comprehensive Metabolic Panel    Collection Time: 01/17/21  6:30 PM   Result Value Ref Range    Sodium 132 (L) 135 - 144 mEq/L    Potassium 4.8 3.4 - 4.9 mEq/L    Chloride 93 (L) 95 - 107 mEq/L    CO2 26 20 - 31 mEq/L    Anion Gap 13 9 - 15 mEq/L    Glucose 142 (H) 70 - 99 mg/dL    BUN 18 8 - 23 mg/dL    CREATININE 0.85 0.50 - 0.90 mg/dL    GFR Non-African American >60.0 >60    GFR  >60.0 >60    Calcium 9.6 8.5 - 9.9 mg/dL    Total Protein 7.1 6.3 - 8.0 g/dL    Alb 4.3 3.5 - 4.6 g/dL    Total Bilirubin 0.7 0.2 - 0.7 mg/dL    Alkaline Phosphatase 92 40 - 130 U/L    ALT 16 0 - 33 U/L    AST 28 0 - 35 U/L    Globulin 2.8 2.3 - 3.5 g/dL   Brain Natriuretic Peptide    Collection Time: 01/17/21  6:30 PM   Result Value Ref Range    Pro- pg/mL   TYPE AND SCREEN    Collection Time: 01/17/21  6:30 PM   Result Value Ref Range    ABO/Rh A POS     Antibody Screen NEG    Microscopic Urinalysis    Collection Time: 01/17/21  6:30 PM   Result Value Ref Range    Bacteria, UA Negative Negative /HPF    Hyaline Casts, UA 3-5 0 - 5 /HPF    WBC, UA 6-9 (A) 0 - 5 /HPF    RBC, UA 6-10 (A) 0 - 5 /HPF    Epithelial Cells, UA 0-2 0 - 5 /HPF   EKG 12 Lead    Collection Time: 01/17/21  6:43 PM   Result Value Ref Range    Ventricular Rate 96 BPM    Atrial Rate 96 BPM    P-R Interval 160 ms QRS Duration 86 ms    Q-T Interval 384 ms    QTc Calculation (Bazett) 485 ms    P Axis 56 degrees    R Axis -54 degrees    T Axis 76 degrees   COVID-19    Collection Time: 01/17/21 10:13 PM   Result Value Ref Range    SARS-CoV-2, NAAT Not Detected Not Detected       IMAGING:  Ct Head Wo Contrast    Result Date: 1/17/2021  CT HEAD WO CONTRAST CLINICAL HISTORY:  Unwitnessed fall with unknown loss of consciousness, left shoulder pain discomfort, left chest pain near shoulder COMPARISON: June 11, 2012 TECHNIQUE: Multiple unenhanced serial axial images of the brain from the vertex of the skull to the base of the skull were performed. FINDINGS: The ventricles are dilated. This is compensatory to the surrounding moderate generalized parenchymal volume loss. No mass. No midline shift. The cisterns are patent. There are white matter and periventricular changes most likely consistent with chronic small vessel disease. No acute intra-axial or extra-axial findings. The visualized osseous structures are unremarkable. The visualized portion of the paranasal sinuses, and mastoids are unremarkable. Both globes are intact. No gross preseptal or post septal findings. NO ACUTE INTRA-AXIAL OR EXTRA-AXIAL FINDINGS. All CT scans at this facility use dose modulation, iterative reconstruction, and/or weight based dosing when appropriate to reduce radiation dose to as low as reasonably achievable.      Ct Chest W Contrast    Result Date: 1/17/2021 EXAMINATION:  CT SCAN CHEST CLINICAL HISTORY:  Fall left-sided pain. COMPARISON:  None TECHNIQUE:  Multiple serial axial images of the chest from the base the neck through the upper abdomen with both sagittal coronal reconstructions was performed finding the intravenous administration of 100 mL of Isovue 300. FINDINGS:  The lungs show no focal parenchymal abnormalities. No pleural effusions. No pneumothoraces. The heart and great vessels are intact. There is mild ectasia the descending thoracic aorta. No significant mediastinal adenopathy. The soft tissue the chest shows no subcutaneous emphysema. The thoracic spine shows multilevel degenerative changes superimposed upon a moderate dorsal kyphosis. No acute fractures. The sternum is intact.  Impression no acute fractures UNREMARKABLE CT SCAN THE CHEST AS DESCRIBED ABOVE All CT scans at this facility use dose modulation, iterative reconstruction, and/or weight based dosing when appropriate to reduce radiation dose to as low as reasonably achievable. Examination: CT CHEST W CONTRAST Indication:   Unwitnessed fall with unknown loss of consciousness, left shoulder pain discomfort, left chest pain near shoulder Technique: Multiple serial axial images was performed through the abdomen and pelvis without intravenous or oral administration of contrast Images were reconstructed in the axial and coronal and sagittal planes. Comparison:  No comparison is available. Findings: The liver, gallbladder, spleen, pancreas, adrenals, are unremarkable. The right kidney shows no significant perinephric stranding. No hydronephrosis. The left kidney is atrophic. There is an area of low-attenuation at the inferior pole too small to characterize. No hydronephrosis. No bladder calculi. Large and small bowel show no sign of obstruction. The appendix is visualized. No periappendiceal stranding. No diverticulitis. There is a surgically absent. No free air. No free fluid. There is a infrarenal abdominal aortic aneurysm measuring 3.5 x 3.2 cm. No significant retroperitoneal adenopathy. There Is a diffuse generalized osteopenia as well as loss of height of approximately 20% of the L2 vertebra. No acute fractures. Note is made of grade 1 anterolisthesis of L4 and L5. The SI joints are intact. Visualized portions of the left and right hips are intact Incidental note is made of a tubular structure in between the thighs the perineum. Correlate with patient history. . IMPRESSION: 1. THERE IS A INFRARENAL ABDOMINAL AORTIC ANEURYSM. 2 OTHER FINDINGS DETAILED ABOVE All CT scans at this facility use dose modulation, iterative reconstruction, and/or weight based dosing when appropriate to reduce radiation dose to as low as reasonably achievable. Ct Cervical Spine Wo Contrast    Result Date: 1/17/2021  CT CERVICAL SPINE WO CONTRAST CLINICAL HISTORY:  Unwitnessed fall with unknown loss of consciousness, left shoulder pain discomfort, left chest pain near shoulder COMPARISON: NONE Findings: Multiple serial axial images of the cervical spine from the base of the skull through the upper thoracic vertebra with both sagittal and coronal reconstructions was performed. There is straightening of the normal expected cervical lordosis. There is multilevel degenerative joint disease. Prevertebral  soft tissues are  unremarkable. The disk spaces are narrowed at the C5-C6, 6-7 disc spaces. No acute fractures or spondylo-listhesis. .     NO ACUTE FRACTURES. All CT scans at this facility use dose modulation, iterative reconstruction, and/or weight based dosing when appropriate to reduce radiation dose to as low as reasonably achievable. Ct Thoracic Spine Wo Contrast    Result Date: 1/17/2021  CT SCAN OF THE THORACIC SPINE . CLINICAL HISTORY: Pain, fall COMPARISON: NONE Findings: Multiple serial axial images of the thoracic spine from the lower cervical spine  through the upper lumber vertebra with both sagittal and coronal reconstructions was performed. There is diffuse general as osteopenia. There is a moderate dorsal kyphosis. The paravertebral and soft tissues are  unremarkable. The disk spaces are intact. There is multilevel degenerative changes with bridging osteophytes No acute fractures or spondylo-listhesis. NO ACUTE FRACTURES. All CT scans at this facility use dose modulation, iterative reconstruction, and/or weight based dosing when appropriate to reduce radiation dose to as low as reasonably achievable. Examination: CT scan lumbar spine CLINICAL DATA: Pain, fall TECHNIQUE: Multiple serial axial images of the lumbar spine from the lower thoracic through the sacral/coccygeal levels with both sagittal coronal reconstruction was performed FINDINGS: There are 5 lumbar type vertebra. Is a diffuse generalized osteopenia. The disc spaces are narrowed at the L1-2 and L5-S1 disc space. . There is a grade 1 anterolisthesis of L4 and L5. Trude Roers There is multilevel degenerative changes of posterior facets of lower lumbar spine The SI joints are intact. Approximately 20% loss of height of the L2 vertebra. Likely old No new acute fractures. IMPRESSION: NO ACUTE FRACTURES GRADE 1 ANTEROLISTHESIS OF L4 on L5    Ct Lumbar Spine Wo Contrast    Result Date: 1/17/2021  CT SCAN OF THE THORACIC SPINE . CLINICAL HISTORY: Pain, fall COMPARISON: NONE Findings: Multiple serial axial images of the thoracic spine from the lower cervical spine  through the upper lumber vertebra with both sagittal and coronal reconstructions was performed. There is diffuse general as osteopenia. There is a moderate dorsal kyphosis. The paravertebral and soft tissues are  unremarkable. The disk spaces are intact. There is multilevel degenerative changes with bridging osteophytes No acute fractures or spondylo-listhesis. NO ACUTE FRACTURES. All CT scans at this facility use dose modulation, iterative reconstruction, and/or weight based dosing when appropriate to reduce radiation dose to as low as reasonably achievable. Examination: CT scan lumbar spine CLINICAL DATA: Pain, fall TECHNIQUE: Multiple serial axial images of the lumbar spine from the lower thoracic through the sacral/coccygeal levels with both sagittal coronal reconstruction was performed FINDINGS: There are 5 lumbar type vertebra. Is a diffuse generalized osteopenia. The disc spaces are narrowed at the L1-2 and L5-S1 disc space. . There is a grade 1 anterolisthesis of L4 and L5. Adriana Antony There is multilevel degenerative changes of posterior facets of lower lumbar spine The SI joints are intact. Approximately 20% loss of height of the L2 vertebra. Likely old No new acute fractures. IMPRESSION: NO ACUTE FRACTURES GRADE 1 ANTEROLISTHESIS OF L4 on L5      VTE Prophylaxis: low molecular weight heparin -  start    ASSESSMENT AND PLAN    Principal Problem:    1) AMS: Memory loss after possible fall at home. Fall unwitnessed. We will get PT OT. We will consult case management for placement versus home health care. 2) Frequent falls: Unwitnessed fall at home today. Family reports multiple falls to ER staff. Patient has no memory of fall but states she has left shoulder and left chest pain producible on palpation and movement. Left shoulder/elbow x-ray negative for fracture dislocation. CT of head C-spine T-spine L-spine chest and abdomen pelvis negative for acute abnormalities. We will provide comfort measures including but not limited to pain medication, ice, and position change. We will get PT OT. We will consult case management for placement versus home health care. We will also cycle troponin repeat EKG to rule out cardiac nature of chest pain. 3) Hyponatremia: Sodium mildly elevated at 132. We will provide IV hydration. We will encourage oral hydration. We will check CMP daily. Active Problems:  4) Alzheimer's dementia without behavioral disturbance: EMR shows history of dementia. RN reviewed medications with family. Patient on no medications for Alzheimer's  5) Esophageal reflux: Patient on home meds to control. We will resume home meds  6) Essential hypertension: Patient on home meds to control. 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 We will resume home meds  8)Antiplatelet therapy at home: Patient on Plavix. Patient does not know why. Medical record shows history of CVA. We will resume home meds. Plan of care discussed with: patient    SIGNATURE: Yanira Connolly RN, NP  DATE: January 18, 2021  TIME: 1:26 AM     WEI Horvath MD - supervising physician

## 2021-01-18 NOTE — CONSULTS
Department of Orthopedic Surgery  Attending Consult Note        Reason for Consult: Left shoulder and elbow pain  Requesting Physician:      CHIEF COMPLAINT: Left shoulder and elbow pain    History Obtained From:  patient, electronic medical record    HISTORY OF PRESENT ILLNESS:                The patient is a 80 y.o. female admitted after a fall of with uncertain circumstances  She does have a history of frequent falls  Also history of Alzheimer's dementia. Past Medical History:        Diagnosis Date    Hypertension     TIA (transient ischemic attack)     Uterine cancer ([facility] [address]      Past Surgical History:        Procedure Laterality Date    FRACTURE SURGERY      JOINT REPLACEMENT       Current Medications:   Current Facility-Administered Medications: labetalol (NORMODYNE;TRANDATE) injection 10 mg, 10 mg, Intravenous, Q4H PRN  sodium chloride flush 0.9 % injection 10 mL, 10 mL, Intravenous, 2 times per day  sodium chloride flush 0.9 % injection 10 mL, 10 mL, Intravenous, PRN  enoxaparin (LOVENOX) injection 40 mg, 40 mg, Subcutaneous, Daily  promethazine (PHENERGAN) tablet 12.5 mg, 12.5 mg, Oral, Q6H PRN **OR** ondansetron (ZOFRAN) injection 4 mg, 4 mg, Intravenous, Q6H PRN  polyethylene glycol (GLYCOLAX) packet 17 g, 17 g, Oral, Daily PRN  acetaminophen (TYLENOL) tablet 650 mg, 650 mg, Oral, Q6H PRN **OR** acetaminophen (TYLENOL) suppository 650 mg, 650 mg, Rectal, Q6H PRN  ketorolac (TORADOL) injection 15 mg, 15 mg, Intravenous, Q6H PRN  melatonin tablet 3 mg, 3 mg, Oral, Nightly PRN  citalopram (CELEXA) tablet 20 mg, 20 mg, Oral, Daily  clopidogrel (PLAVIX) tablet 75 mg, 75 mg, Oral, Daily  hydrALAZINE (APRESOLINE) tablet 25 mg, 25 mg, Oral, BID  hydroCHLOROthiazide (HYDRODIURIL) tablet 25 mg, 25 mg, Oral, Daily  metoprolol tartrate (LOPRESSOR) tablet 25 mg, 25 mg, Oral, BID  atorvastatin (LIPITOR) tablet 10 mg, 10 mg, Oral, Daily  Allergies:  Patient has no known allergies.     Social History:   Unknown Family History:   History reviewed. No pertinent family history.   REVIEW OF SYSTEMS:    Review of systems not obtained due to patient factors - mental status    PHYSICAL EXAM:    VITALS:  BP (!) 154/57   Pulse 66   Temp 98.2 °F (36.8 °C) (Oral)   Resp 18   Ht 5' (1.524 m)   Wt 175 lb (79.4 kg)   SpO2 99%   BMI 34.18 kg/m²   Resting comfortably in bed  Sling to the left upper extremity  MUSCULOSKELETAL: There is no obvious swelling or bruising to the left shoulder  The left shoulder is mildly tender to palpation  There is limited left shoulder range of motion  Her elbow is not tender  She is able to range the elbow within the confines of the sling without pain    DATA:    Radiology Review: X-rays of the shoulder the elbow and the chest CT scan which include the shoulder quite well were reviewed and did not show any evidence of fracture  There is advanced left glenohumeral arthritis  There are no fat pad signs appreciated in the elbow    IMPRESSION/RECOMMENDATIONS:    Exacerbation of left shoulder arthritis with contusion    Sling can be used for comfort only  Ice may benefit the left shoulder, that is for comfort only as well  She may use the left upper extremity to her tolerance  She does not require any orthopedic intervention  Orthopedic signing off while in house  She can follow-up as an outpatient should that be necessary after discharge

## 2021-01-18 NOTE — PROGRESS NOTES
Hospitalist Progress Note      PCP: No primary care provider on file. Date of Admission: 1/17/2021    Chief Complaint:  No acute events, afebrile, on RA, hypertensive with SBP in the 200s overnight requiring IV Labetalol, complains of left elbow pain, has trouble lifting her left arm due to pain    Medications:  Reviewed    Infusion Medications   Scheduled Medications    sodium chloride flush  10 mL Intravenous 2 times per day    enoxaparin  40 mg Subcutaneous Daily    citalopram  20 mg Oral Daily    clopidogrel  75 mg Oral Daily    hydrALAZINE  25 mg Oral BID    hydroCHLOROthiazide  25 mg Oral Daily    metoprolol tartrate  25 mg Oral BID    atorvastatin  10 mg Oral Daily     PRN Meds: labetalol, sodium chloride flush, promethazine **OR** ondansetron, polyethylene glycol, acetaminophen **OR** acetaminophen, ketorolac, melatonin      Intake/Output Summary (Last 24 hours) at 1/18/2021 1436  Last data filed at 1/18/2021 1134  Gross per 24 hour   Intake 865 ml   Output    Net 865 ml       Exam:    BP (!) 154/57   Pulse 66   Temp 98.2 °F (36.8 °C) (Oral)   Resp 18   Ht 5' (1.524 m)   Wt 175 lb (79.4 kg)   SpO2 99%   BMI 34.18 kg/m²     General appearance: appears stated age and cooperative. Respiratory:  clear to auscultation bilaterally . Cardiovascular: Regular rate and rhythm, S1/S2. Abdomen: Soft, active bowel sounds. Musculoskeletal: limited ROM of the left upper extremity due to pain.         Labs:   Recent Labs     01/17/21  1830 01/18/21  0550   WBC 9.0 9.1   HGB 13.9 12.3   HCT 41.8 37.2    157     Recent Labs     01/17/21  1830 01/18/21  0550   * 134*   K 4.8 4.1   CL 93* 97   CO2 26 25   BUN 18 18   CREATININE 0.85 0.94*   CALCIUM 9.6 8.8     Recent Labs     01/17/21  1830 01/18/21  0550   AST 28 17   ALT 16 12   BILITOT 0.7 0.6   ALKPHOS 92 66     Recent Labs     01/17/21  1830   INR 0.9     Recent Labs     01/17/21  1830 01/18/21  0047 01/18/21  0550 All CT scans at this facility use dose modulation, iterative reconstruction, and/or weight based dosing when appropriate to reduce radiation dose to as low as reasonably achievable. Examination: CT scan lumbar spine      CLINICAL DATA: Pain, fall      TECHNIQUE: Multiple serial axial images of the lumbar spine from the lower thoracic through the sacral/coccygeal levels with both sagittal coronal reconstruction was performed      FINDINGS:   There are 5 lumbar type vertebra. Is a diffuse generalized osteopenia. The disc spaces are narrowed at the L1-2 and L5-S1 disc space. .   There is a grade 1 anterolisthesis of L4 and L5. Lawrance Potter There is multilevel degenerative changes of posterior facets of lower lumbar spine      The SI joints are intact. Approximately 20% loss of height of the L2 vertebra. Likely old   No new acute fractures. IMPRESSION:      NO ACUTE FRACTURES   GRADE 1 ANTEROLISTHESIS OF L4 on L5      CT CHEST W CONTRAST   Final Result   UNREMARKABLE CT SCAN THE CHEST AS DESCRIBED ABOVE         All CT scans at this facility use dose modulation, iterative reconstruction, and/or weight based dosing when appropriate to reduce radiation dose to as low as reasonably achievable. Examination: CT CHEST W CONTRAST      Indication:   Unwitnessed fall with unknown loss of consciousness, left shoulder pain discomfort, left chest pain near shoulder       Technique: Multiple serial axial images was performed through the abdomen and pelvis without intravenous or oral administration of contrast Images were reconstructed in the axial and coronal and sagittal planes. Comparison:  No comparison is available. Findings: The liver, gallbladder, spleen, pancreas, adrenals, are unremarkable. The right kidney shows no significant perinephric stranding. No hydronephrosis. The left kidney is atrophic. There is an area of low-attenuation at the inferior pole too small to characterize. No hydronephrosis. No bladder calculi. Large and small bowel show no sign of obstruction. The appendix is visualized. No periappendiceal stranding. No diverticulitis. There is a surgically absent. No free air. No free fluid. There is a infrarenal abdominal aortic aneurysm measuring 3.5 x 3.2 cm. No significant retroperitoneal adenopathy. There Is a diffuse generalized osteopenia as well as loss of height of approximately 20% of the L2 vertebra. No acute fractures. Note is made of grade 1 anterolisthesis of L4 and L5. The SI joints are intact. Visualized portions of the left and right hips are intact      Incidental note is made of a tubular structure in between the thighs the perineum. Correlate with patient history. .      IMPRESSION:   1. THERE IS A INFRARENAL ABDOMINAL AORTIC ANEURYSM.   2 OTHER FINDINGS DETAILED ABOVE         All CT scans at this facility use dose modulation, iterative reconstruction, and/or weight based dosing when appropriate to reduce radiation dose to as low as reasonably achievable. CT ABDOMEN PELVIS W IV CONTRAST Additional Contrast? None    (Results Pending)           Assessment/Plan:    80 y.o. female with a history of hypertension, TIA, falls who presented with:     Falls with LUE pain and limited ROM  - found on the floor by family  - trauma w/u obtained in ED ws negative for acute findings  - PT/OT and Ortho eval    Hypertensive urgency  - with SBP in the 200s  - requiring IV Labetalol  - resumed home meds  - monitor BP closely    Hyponatremia   - improving, monitor    Disposition - plan for home with [facility] per                  Electronically signed by Wai Julian MD on 1/18/2021 at 2:36 PM

## 2021-01-18 NOTE — PROGRESS NOTES
[facility] OCCUPATIONAL THERAPY EVALUATION - ACUTE     NAME: Aga Oh  : 8/15/1930 (24 y.o.)  MRN: [id number]  CODE STATUS: Full Code  Room: [place] [id number]    Date of Service: 2021    Patient Diagnosis(es): Frequent falls [R29.6]   Chief Complaint   Patient presents with    Fall      [address] UNKNOW LOC     Patient Active Problem List    Diagnosis Date Noted    Hyponatremia 2021    [facility]VD [facility]peripheral vascular disease) [facility] 2021    Frequent falls 2021    Alzheimer's dementia without behavioral disturbance ([facility] 2019    Major depressive disorder in full remission ([facility] 2019    Essential hypertension 03/10/2017    Other after-cataract, not obscuring vision 2014    Lens replaced by other means 2014    Keratoconus, stable condition 2014    Pulmonary HTN ([facility] 2014    Tricuspid regurgitation 2014    Dizziness 2014    Vitamin D insufficiency 2014    Endometrial cancer ([facility] 2014    Occlusion and stenosis of carotid artery without mention of cerebral infarction 2009    Osteoarthrosis involving lower leg 2008    Hypothyroidism 2004    Pure hypercholesterolemia 2004    Esophageal reflux 2004    Generalized osteoarthrosis, unspecified site 2004        Past Medical History:   Diagnosis Date    Hypertension     TIA (transient ischemic attack)     Uterine cancer ([facility]      Past Surgical History:   Procedure Laterality Date    FRACTURE SURGERY      JOINT REPLACEMENT          Restrictions  Restrictions/Precautions: Fall Risk     Safety Devices: Safety Devices  Safety Devices in place: Yes  Type of devices:  All fall risk precautions in place      Subjective  Pre Treatment Pain Screening  Pain at present: 0  Scale Used: Numeric Score  Intervention List: Patient able to continue with treatment  Comments / Details: Reports 7/10 pain but states that is only with movement    Pain Reassessment: Pain Assessment  Patient Currently in Pain: Yes  Pain Assessment: 0-10  Oliveira-Baker Pain Rating: Hurts a little bit  Pain Level: 7  Pain Type: Acute pain  Pain Location: Arm  Pain Orientation: Left  Pain Descriptors: Aching, Dull     Prior Level of Function:  Social/Functional History  Lives With: Alone  Type of Home: House  Home Layout: Two level, Laundry in basement(Bedroom upstairs, full bath on each level)  Home Access: Stairs to enter with rails  Entrance Stairs - Number of Steps: 2  Entrance Stairs - Rails: Both  Bathroom Shower/Tub: Walk-in shower, Tub/Shower unit(Walk-in on first floor)  Bathroom Equipment: Grab bars in shower, Grab bars around toilet, Shower chair, Hand-held shower  Home Equipment: 4 wheeled walker, Cane, Wheelchair-manual(rollator, possibly 2; several canes)  Receives Help From: Family, Neighbor(Hired cleaning)  ADL Assistance: Independent  Homemaking Assistance: Needs assistance  Homemaking Responsibilities: Yes(Pt tidies up herself)  Ambulation Assistance: Independent  Transfer Assistance: Independent  Active : No  Patient's  Info: Family drives her  Occupation: Retired  Type of occupation:   Leisure & Hobbies: Used to love to dance; spends time with family  IADL Comments: Pt reports 3 daughters are very helpful with meals, meds, laundry. Son-in-laws help with fixing things  Additional Comments: Pt's children are local and check in on her frequently. Pt is a questionable historian; seems confused on the location of her laundry and mild difficulty recalling home set up. OBJECTIVE:     Orientation Status:  Orientation  Overall Orientation Status: Impaired  Orientation Level: Disoriented to time, Oriented to person, Oriented to situation, Disoriented to place    Observation:  Observation/Palpation  Observation: Pleasant and cooperative. Pt repeats herself multiple times. L arm sling donned.     Cognition Status:  Cognition  Overall Cognitive Status: Exceptions Toileting: Moderate assistance  Additional Comments: Simulated ADLs bedside with anticipated levels listed above. Pt able to reach B feet with use of stool or figure 4 technique. Pt uses L hand with minimal movement at shoulder/elbow. Difficulty reaching with R UE to L hip for clothing management. Toilet Transfers  Toilet - Technique: Ambulating  Equipment Used: Grab bars  Toilet Transfer: Stand by assistance  Toilet Transfers Comments: Pt reached for L side grab bar with R hand.  VCs for safety; Sits and stands with no LOB  Tub Transfers  Tub Transfers: Minimal assistance  Tub Transfers Comments: anticipated level based on other t/fs    Therapy key for assistance levels    Independent = Pt. is able to perform task with no assistance but may require a device   Stand by assistance = Pt. does not perform task at an independent level but does not need physical assistance, requires verbal cues  Minimal, Moderate, Maximal Assistance = Pt. requires physical assistance (25%, 50%, 75% assist from helper) for task but is able to actively participate in task   Dependent = Pt. requires total assistance with task and is not able to actively participate with task completion     Functional Mobility:  Functional Mobility  Functional - Mobility Device: No device  Activity: To/from bathroom  Assist Level: Stand by assistance  Functional Mobility Comments: Pt moved slowly with VCs for safety, \"furntiture walking\"  Transfers  Sit to stand: Stand by assistance  Stand to sit: Stand by assistance    Bed Mobility  Bed mobility  Rolling to Right: Supervision  Supine to Sit: Supervision  Sit to Supine: Supervision    Seated and Standing Balance:  Balance  Sitting Balance: Modified independent   Standing Balance: Stand by assistance    Functional Endurance:  Activity Tolerance  Activity Tolerance: Patient Tolerated treatment well  Activity Tolerance: Good-    D/C Recommendations:  OT D/C RECOMMENDATIONS  REQUIRES OT FOLLOW UP: Yes Equipment Recommendations:  OT Equipment Recommendations  Equipment Needed: Yes  Other: Continue to assess    OT Education:   OT Education  OT Education: OT Role, Plan of Care, ADL Adaptive Strategies  Barriers to Learning: Pt's memory    OT Follow Up:  OT D/C RECOMMENDATIONS  REQUIRES OT FOLLOW UP: Yes       Assessment/Discharge Disposition:  Assessment: Pt is 81 y/o female from home alone with family support who presents to [facility] with the above functional deficits s/p fall. Pt's L UE injury and pain limit her ability to complete self-care IND'ly and safely. Pt also presents with decreased memory and safety awareness and seems resistant to receiving more assistance than she already does. Pt repeatedly reported that her daughters and sons-in-law are always checking in and she also has wonderful neighbors, stating \"we all help each other with whatever we need. \" Pt will benefit from OT services to increase IND and safety with ADLs and functional t/fs.   Performance deficits / Impairments: Decreased functional mobility , Decreased safe awareness, Decreased ADL status, Decreased cognition, Decreased ROM, Decreased endurance, Decreased strength, Decreased fine motor control, Decreased balance  Prognosis: Good  Discharge Recommendations: Continue to assess pending progress, Home with nursing aide  Decision Making: Medium Complexity  History: 5 complexities  Exam: 9 performance deficits  Assistance / Modification: Min - Mod A    Six Click Score   How much help for putting on and taking off regular lower body clothing?: A Little  How much help for Bathing?: A Little  How much help for Toileting?: A Lot  How much help for putting on and taking off regular upper body clothing?: A Little  How much help for taking care of personal grooming?: A Little  How much help for eating meals?: A Little  AM-[facility] Inpatient Daily Activity Raw Score: 17  AM-PAC Inpatient ADL T-Scale Score : 37.26  ADL Inpatient CMS 0-100% Score: 50.11 Plan:  Plan  Times per week: 1-3x  Plan weeks: Acute length of stay  Current Treatment Recommendations: Self-Care / ADL, Cognitive Reorientation, Functional Mobility Training, Equipment Evaluation, Education, & procurement, Patient/Caregiver Education & Training, Balance Training, Safety Education & Training, Positioning    Goals:   Patient will:    - Be MOD I in UB ADLs   - Be MOD I in LB ADLs  - Be MOD I in ADL transfers without LOB  - Be MOD I in toileting tasks  - Access appropriate D/C site with as few architectural barriers as possible. Patient Goal: Patient goals : To go back home.      Discussed and agreed upon: Yes Comments:     Therapy Time:   OT Individual Minutes  Time In: 5565  Time Out: 1520  Minutes: 31    Eval: 31 minutes     Electronically signed by:    EBV Renteria  1/18/2021, 3:48 PM

## 2021-01-18 NOTE — CARE COORDINATION
[facility] EMERGENCY [facility] CENTER AT [place] Case Management Initial Discharge Assessment    Met with daughter Ricco Davila on the phone to discuss discharge plan. PCP: No primary care provider on file. Date of Last Visit: 10/28/20 Telemedicine    If no PCP, list provided? N/A    Discharge Planning    Living Arrangements: Independent at home for many things, but also relies on family for meals and such    Who do you live with? Alone    Who helps you with your care:  self or family    If lives at home:     Do you have any barriers navigating in your home? No generally, but pt may have suffered a fall    Patient can perform ADL? Yes    Current Services (outpatient and in home) :  None    Dialysis: No    Is transportation available to get to your appointments? Yes    DME Equipment:  no    Respiratory equipment: None    Respiratory provider:  no     Pharmacy:  yes - [facility] with Medication Assistance Program?  No      Patient agreeable to [place] [address]? Yes-HC    Patient agreeable to SNF/Rehab? No    Other discharge needs identified? Other 24 hour supervision    Freedom of choice list provided with basic dialogue that supports the patient's individualized plan of care/goals and shares the quality data associated with the providers. Yes    Does Patient Have a High-Risk for Readmission Diagnosis (CHF, PN, MI, COPD)? No     The plan for Transition of Care is related to the following treatment goals: Therapy    Initial Discharge Plan? (Note: please see concurrent daily documentation for any updates after initial note). Pt has some confusion and memory impairment. Phone call made to daughter Ricco Davila who lives locally and states she is the [place]. No POA papers currently on file. There are 2 other daughters, Julianna Yeung and Dov [place]. Destiny Dominguez states spouse of pt   and pt seems to be getting progressively worse since. Destiny Dominguez takes her meals daily, drives to appointments etc, but she said pt is resistant to much help. She had been moving around independently and not using any DME. Currently Leticia plans for pt to return to home with her and she would like [facility]. Destiny Dominguez resides at [address] [place] [address] In [place], [place].     The Patient and/or patient representative: daughter was provided with choice of any post-acute providers for care and equipment and agrees with discharge plan  Yes    Electronically signed by HILARIO Jamison on 2021 at 2:09 PM

## 2021-01-18 NOTE — ED NOTES
Patient c/o being cold and feeling \"lousy\", pain to left shoulder, [facility] ok to give another dose of fentanyl early, currently ordered Q1hour PRN, given at this timem warn blankets for comfort, updated patient and family on plan of care.      Jm Arnold RN  01/17/21 1950

## 2021-01-19 VITALS
HEART RATE: 66 BPM | TEMPERATURE: 97.9 F | HEIGHT: 60 IN | SYSTOLIC BLOOD PRESSURE: 156 MMHG | OXYGEN SATURATION: 99 % | RESPIRATION RATE: 18 BRPM | DIASTOLIC BLOOD PRESSURE: 69 MMHG | BODY MASS INDEX: 35.53 KG/M2 | WEIGHT: 181 LBS

## 2021-01-19 LAB
ALBUMIN SERPL-MCNC: 3 G/DL (ref 3.5–4.6)
ALP BLD-CCNC: 61 U/L (ref 40–130)
ALT SERPL-CCNC: 12 U/L (ref 0–33)
ANION GAP SERPL CALCULATED.3IONS-SCNC: 10 MEQ/L (ref 9–15)
AST SERPL-CCNC: 20 U/L (ref 0–35)
BILIRUB SERPL-MCNC: 0.7 MG/DL (ref 0.2–0.7)
BUN BLDV-MCNC: 17 MG/DL (ref 8–23)
CALCIUM SERPL-MCNC: 8.7 MG/DL (ref 8.5–9.9)
CHLORIDE BLD-SCNC: 99 MEQ/L (ref 95–107)
CO2: 23 MEQ/L (ref 20–31)
CREAT SERPL-MCNC: 0.86 MG/DL (ref 0.5–0.9)
GFR AFRICAN AMERICAN: >60
GFR NON-AFRICAN AMERICAN: >60
GLOBULIN: 2.5 G/DL (ref 2.3–3.5)
GLUCOSE BLD-MCNC: 112 MG/DL (ref 70–99)
HCT VFR BLD CALC: 35.7 % (ref 37–47)
HEMOGLOBIN: 11.9 G/DL (ref 12–16)
MAGNESIUM: 1.8 MG/DL (ref 1.7–2.4)
MCH RBC QN AUTO: 33.3 PG (ref 27–31.3)
MCHC RBC AUTO-ENTMCNC: 33.4 % (ref 33–37)
MCV RBC AUTO: 99.5 FL (ref 82–100)
PDW BLD-RTO: 13.4 % (ref 11.5–14.5)
PLATELET # BLD: 141 K/UL (ref 130–400)
POTASSIUM REFLEX MAGNESIUM: 4.1 MEQ/L (ref 3.4–4.9)
RBC # BLD: 3.59 M/UL (ref 4.2–5.4)
SODIUM BLD-SCNC: 132 MEQ/L (ref 135–144)
TOTAL PROTEIN: 5.5 G/DL (ref 6.3–8)
WBC # BLD: 6.6 K/UL (ref 4.8–10.8)

## 2021-01-19 PROCEDURE — 83735 ASSAY OF MAGNESIUM: CPT

## 2021-01-19 PROCEDURE — 97116 GAIT TRAINING THERAPY: CPT

## 2021-01-19 PROCEDURE — 36415 COLL VENOUS BLD VENIPUNCTURE: CPT

## 2021-01-19 PROCEDURE — 6360000002 HC RX W HCPCS: Performed by: NURSE PRACTITIONER

## 2021-01-19 PROCEDURE — 97162 PT EVAL MOD COMPLEX 30 MIN: CPT

## 2021-01-19 PROCEDURE — 80053 COMPREHEN METABOLIC PANEL: CPT

## 2021-01-19 PROCEDURE — 85027 COMPLETE CBC AUTOMATED: CPT

## 2021-01-19 PROCEDURE — 2580000003 HC RX 258: Performed by: NURSE PRACTITIONER

## 2021-01-19 PROCEDURE — 6370000000 HC RX 637 (ALT 250 FOR IP): Performed by: INTERNAL MEDICINE

## 2021-01-19 RX ADMIN — HYDROCHLOROTHIAZIDE 25 MG: 25 TABLET ORAL at 10:03

## 2021-01-19 RX ADMIN — ENOXAPARIN SODIUM 40 MG: 40 INJECTION SUBCUTANEOUS at 10:06

## 2021-01-19 RX ADMIN — CITALOPRAM HYDROBROMIDE 20 MG: 10 TABLET ORAL at 10:03

## 2021-01-19 RX ADMIN — CLOPIDOGREL BISULFATE 75 MG: 75 TABLET ORAL at 10:03

## 2021-01-19 RX ADMIN — SODIUM CHLORIDE, PRESERVATIVE FREE 10 ML: 5 INJECTION INTRAVENOUS at 10:09

## 2021-01-19 RX ADMIN — HYDRALAZINE HYDROCHLORIDE 25 MG: 25 TABLET, FILM COATED ORAL at 10:05

## 2021-01-19 RX ADMIN — ATORVASTATIN CALCIUM 10 MG: 10 TABLET, FILM COATED ORAL at 10:54

## 2021-01-19 RX ADMIN — METOPROLOL TARTRATE 25 MG: 25 TABLET, FILM COATED ORAL at 10:05

## 2021-01-19 ASSESSMENT — PAIN SCALES - GENERAL: PAINLEVEL_OUTOF10: 0

## 2021-01-19 NOTE — PROGRESS NOTES
Pt assessment completed this am. Pt alert and oriented, forgetful. Daughter at bedside this morning. Verified home medications, reordered by Dr. Holmes Boxer. Pt rates pain 3/10 while moving it in sling, states it is tolerable, declined pain medication during this shift. Vs stable.

## 2021-01-19 NOTE — PROGRESS NOTES
Prior Level of Function:  Social/Functional History  Lives With: Alone  Type of Home: House  Home Layout: Two level, Laundry in basement(Bedroom upstairs, full bath on each level)  Home Access: Stairs to enter with rails  Entrance Stairs - Number of Steps: 2  Entrance Stairs - Rails: Both  Bathroom Shower/Tub: Walk-in shower, Tub/Shower unit(Walk-in on first floor)  Bathroom Equipment: Grab bars in shower, Grab bars around toilet, Shower chair, Hand-held shower  Home Equipment: 4 wheeled walker, Cane, Wheelchair-manual(rollator, possibly 2; several canes)  Receives Help From: Family, Neighbor(Hired cleaning)  ADL Assistance: Independent  Homemaking Assistance: Needs assistance  Homemaking Responsibilities: Yes(Pt tidies up herself)  Ambulation Assistance: Independent  Transfer Assistance: Independent  Active : No  Patient's  Info: Family drives her  Occupation: Retired  Type of occupation:   Leisure & Hobbies: Used to love to dance; spends time with family  IADL Comments: Pt reports 3 daughters are very helpful with meals, meds, laundry. Son-in-laws help with fixing things  Additional Comments: Per daughter - pt will stay with her initially on the first level of her home.  Pt will have assistance as needed initially    OBJECTIVE:   Vision Exceptions: Wears glasses for reading  Hearing: Within functional limits    Cognition:  Overall Orientation Status: Within Functional Limits  Follows Commands: Within Functional Limits         ROM:  RLE PROM: WFL  LLE PROM: WFL  RUE PROM: WFL  LUE PROM: WFL    Strength:  Strength RLE  Comment: 4-/5  Strength LLE  Comment: 4-/5  Strength LUE  Comment: Pt appears to present with deltoid strain(injury possibly from fall) - xray neg for fracture, neg drop test    Neuro:  Balance  Sitting - Static: Good  Sitting - Dynamic: Good  Standing - Static: Good  Standing - Dynamic: Good;-  Comments: mild decrease in balance reaction timing             Bed mobility Bridging: Independent  Rolling to Left: Independent  Rolling to Right: Independent  Supine to Sit: Independent  Sit to Supine: Independent    Transfers  Sit to Stand: Modified independent  Stand to sit: Modified independent  Bed to Chair: Modified independent    Ambulation  Ambulation?: Yes  Ambulation 1  Surface: level tile  Device: No Device  Assistance: Supervision;Modified Independent  Quality of Gait: initially unsteady - improved to no LOB across multiple trials, mild decreased balance reactions which improved with pt slowing pace to accomodate  Distance: 20-50 feet across multiple trials  Comments: Pts daughter present and states she can assist pt at this level of care    Stairs/Curb  Stairs?: No(pt to stay on first level initially)         Activity Tolerance  Activity Tolerance: Patient Tolerated treatment well          PT Education  PT Education: PT Role;Home Exercise Program;General Safety; Family Education  Patient Education: Pts daughter present and able to assist pt    ASSESSMENT:   Decision Making: Medium Complexity  History: med  Exam: med  Clinical Presentation: med    Patient Education: Pts daughter present and able to assist pt  Barriers to Learning: memory    DISCHARGE RECOMMENDATIONS:  Discharge Recommendations: Home with Home health PT    Assessment: Pt demonstrates mild deficits related to increased pace in gait. Pts daughter able to assist pt at this level of function.  Recommend [facility] initially to assist with determining pts ability to return to her own home in future  REQUIRES PT FOLLOW UP: No      PLAN OF CARE:  Safety Devices  Type of devices: Call light within reach, Chair alarm in place, Left in chair    Goals:       [facility] (6 CLICK) BASIC MOBILITY  AM-PAC Inpatient Mobility Raw Score : 23     Therapy Time:   Individual   Time In 1035   Time Out 1100   Minutes 25    Eval:15 min   Gait:10 min    Kerri Rosario PT, 01/19/21 at 11:13 AM         Definitions for assistance levels Independent = pt does not require any physical supervision or assistance from another person for activity completion. Device may be needed.   Stand by assistance = pt requires verbal cues or instructions from another person, close to but not touching, to perform the activity  Minimal assistance= pt performs 75% or more of the activity; assistance is required to complete the activity  Moderate assistance= pt performs 50% of the activity; assistance is required to complete the activity  Maximal assistance = pt performs 25% of the activity; assistance is required to complete the activity  Dependent = pt requires total physical assistance to accomplish the task

## 2021-01-19 NOTE — FLOWSHEET NOTE
Patient resting quietly in bed with eyes closed; sat up in the chair for a long period this morning. Daughter visited. Alert and oriented to self only. [address]. Follow commands. Patient will be discharge home today after daughter doctor's appointment. Ambulated up in her room and bathroom. Lungs clear and diminished. 1+ edema to lower legs. Tele. [id number].

## 2021-01-19 NOTE — PROGRESS NOTES
Discharge instructions given to daughter with understanding. Both SL's removed and telemetry sent to Health system. Patient had a shower and clothes placed on er Digna PCA. Patient sitting up in the chair waiting for transporter.

## 2021-01-19 NOTE — DISCHARGE INSTR - DIET
? Good nutrition is important when healing from an illness, injury, or surgery. Follow any nutrition recommendations given to you during your hospital stay. ? If you were given an oral nutrition supplement while in the hospital, continue to take this supplement at home. You can take it with meals, in-between meals, and/or before bedtime. These supplements can be purchased at most local grocery stores, pharmacies, and chain [employer]-stores. ? If you have any questions about your diet or nutrition, call the hospital and ask for the dietitian. Diet as tolerated.

## 2021-01-19 NOTE — FLOWSHEET NOTE
Assessment completed. Patient is alert and oriented to self. Vitals stable, except /77. Normal sinus per tele. Medicated per MAR. She is complaining of pain 6/10 in left arm, medicated with PRN Toradol. Denies other complaints. She is on fall precautions, up to bathroom with 1 assist. Call light in reach. Will continue to monitor.

## 2021-01-19 NOTE — CARE COORDINATION
Spoke with PT and recommendations. Per notes dtr wanted [facility] and she will be residing with her at her [place] home. Confirmed w pt this is what she wants, left contact for [facility] with pt. Message left with dtr's V.M. and called [facility] information given to Rudy Camacho LPN with contact (dtr Bettina Frey) &  address she will be staying at [facility]. [address].  Need Krishan  order, nursing aware.  Electronically signed by Golden Morales RN on 1/19/2021 at 11:51 AM

## 2021-01-19 NOTE — DISCHARGE SUMMARY
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 *      Hospital Course:   Guanakito Gregorio is a 80 y.o. female that was admitted and treated at [facility] for the following medical issues:     Falls with LUE pain and limited ROM  - likely due to exacerbation of left shoulder arthritis with contusion   - trauma w/u obtained in ED was negative for acute findings  - conservative therapy per Ortho  - clinically improved    Hypertensive urgency  - with SBP in the 200s due to severe pain on arrival requiring IV Labetalol  - improved  - continue home meds      Disposition - home with [facility] AT [facility]      Patient was seen by the following consultants while admitted to [facility]:   Consults:  0 [facility]    Significant Diagnostic Studies:    Xr Elbow Left (min 3 Views)    Result Date: 2021   EXAMINATION: XR ELBOW LEFT (MIN 3 VIEWS) CLINICAL HISTORY: Pain COMPARISONS: None available TECHNIQUE: AP, lateral, oblique and radial head views of the elbow obtained. FINDINGS:  No acute fracture or dislocation. No elbow joint effusion. Soft tissues are within normal limits. No acute osseous abnormality.     Ct Head Wo Contrast    Result Date: 2021 CT HEAD WO CONTRAST CLINICAL HISTORY:  Unwitnessed fall with unknown loss of consciousness, left shoulder pain discomfort, left chest pain near shoulder COMPARISON: June 11, 2012 TECHNIQUE: Multiple unenhanced serial axial images of the brain from the vertex of the skull to the base of the skull were performed. FINDINGS: The ventricles are dilated. This is compensatory to the surrounding moderate generalized parenchymal volume loss. No mass. No midline shift. The cisterns are patent. There are white matter and periventricular changes most likely consistent with chronic small vessel disease. No acute intra-axial or extra-axial findings. The visualized osseous structures are unremarkable. The visualized portion of the paranasal sinuses, and mastoids are unremarkable. Both globes are intact. No gross preseptal or post septal findings. NO ACUTE INTRA-AXIAL OR EXTRA-AXIAL FINDINGS. All CT scans at this facility use dose modulation, iterative reconstruction, and/or weight based dosing when appropriate to reduce radiation dose to as low as reasonably achievable. Ct Chest W Contrast    Result Date: 1/17/2021  EXAMINATION:  CT SCAN CHEST CLINICAL HISTORY:  Fall left-sided pain. COMPARISON:  None TECHNIQUE:  Multiple serial axial images of the chest from the base the neck through the upper abdomen with both sagittal coronal reconstructions was performed finding the intravenous administration of 100 mL of Isovue 300. FINDINGS:  The lungs show no focal parenchymal abnormalities. No pleural effusions. No pneumothoraces. The heart and great vessels are intact. There is mild ectasia the descending thoracic aorta. No significant mediastinal adenopathy. The soft tissue the chest shows no subcutaneous emphysema. The thoracic spine shows multilevel degenerative changes superimposed upon a moderate dorsal kyphosis. No acute fractures. The sternum is intact.  Impression no acute fractures UNREMARKABLE CT SCAN THE CHEST AS DESCRIBED ABOVE All CT scans at this facility use dose modulation, iterative reconstruction, and/or weight based dosing when appropriate to reduce radiation dose to as low as reasonably achievable. Examination: CT CHEST W CONTRAST Indication:   Unwitnessed fall with unknown loss of consciousness, left shoulder pain discomfort, left chest pain near shoulder Technique: Multiple serial axial images was performed through the abdomen and pelvis without intravenous or oral administration of contrast Images were reconstructed in the axial and coronal and sagittal planes. Comparison:  No comparison is available. Findings: The liver, gallbladder, spleen, pancreas, adrenals, are unremarkable. The right kidney shows no significant perinephric stranding. No hydronephrosis. The left kidney is atrophic. There is an area of low-attenuation at the inferior pole too small to characterize. No hydronephrosis. No bladder calculi. Large and small bowel show no sign of obstruction. The appendix is visualized. No periappendiceal stranding. No diverticulitis. There is a surgically absent. No free air. No free fluid. There is a infrarenal abdominal aortic aneurysm measuring 3.5 x 3.2 cm. No significant retroperitoneal adenopathy. There Is a diffuse generalized osteopenia as well as loss of height of approximately 20% of the L2 vertebra. No acute fractures. Note is made of grade 1 anterolisthesis of L4 and L5. The SI joints are intact. Visualized portions of the left and right hips are intact Incidental note is made of a tubular structure in between the thighs the perineum. Correlate with patient history. . IMPRESSION: 1. THERE IS A INFRARENAL ABDOMINAL AORTIC ANEURYSM. 2 OTHER FINDINGS DETAILED ABOVE All CT scans at this facility use dose modulation, iterative reconstruction, and/or weight based dosing when appropriate to reduce radiation dose to as low as reasonably achievable. Ct Cervical Spine Wo Contrast    Result Date: 1/17/2021  CT CERVICAL SPINE WO CONTRAST CLINICAL HISTORY:  Unwitnessed fall with unknown loss of consciousness, left shoulder pain discomfort, left chest pain near shoulder COMPARISON: NONE Findings: Multiple serial axial images of the cervical spine from the base of the skull through the upper thoracic vertebra with both sagittal and coronal reconstructions was performed. There is straightening of the normal expected cervical lordosis. There is multilevel degenerative joint disease. Prevertebral  soft tissues are  unremarkable. The disk spaces are narrowed at the C5-C6, 6-7 disc spaces. No acute fractures or spondylo-listhesis. .     NO ACUTE FRACTURES. All CT scans at this facility use dose modulation, iterative reconstruction, and/or weight based dosing when appropriate to reduce radiation dose to as low as reasonably achievable. Ct Thoracic Spine Wo Contrast    Result Date: 1/17/2021  CT SCAN OF THE THORACIC SPINE . CLINICAL HISTORY: Pain, fall COMPARISON: NONE Findings: Multiple serial axial images of the thoracic spine from the lower cervical spine  through the upper lumber vertebra with both sagittal and coronal reconstructions was performed. There is diffuse general as osteopenia. There is a moderate dorsal kyphosis. The paravertebral and soft tissues are  unremarkable. The disk spaces are intact. There is multilevel degenerative changes with bridging osteophytes No acute fractures or spondylo-listhesis. NO ACUTE FRACTURES. All CT scans at this facility use dose modulation, iterative reconstruction, and/or weight based dosing when appropriate to reduce radiation dose to as low as reasonably achievable. Examination: CT scan lumbar spine CLINICAL DATA: Pain, fall TECHNIQUE: Multiple serial axial images of the lumbar spine from the lower thoracic through the sacral/coccygeal levels with both sagittal coronal reconstruction was performed FINDINGS: There are 5 lumbar type vertebra. Is a diffuse generalized osteopenia. The disc spaces are narrowed at the L1-2 and L5-S1 disc space. . There is a grade 1 anterolisthesis of L4 and L5. Yvrose Profit There is multilevel degenerative changes of posterior facets of lower lumbar spine The SI joints are intact. Approximately 20% loss of height of the L2 vertebra. Likely old No new acute fractures. IMPRESSION: NO ACUTE FRACTURES GRADE 1 ANTEROLISTHESIS OF L4 on L5    Ct Lumbar Spine Wo Contrast    Result Date: 1/17/2021  CT SCAN OF THE THORACIC SPINE . CLINICAL HISTORY: Pain, fall COMPARISON: NONE Findings: Multiple serial axial images of the thoracic spine from the lower cervical spine  through the upper lumber vertebra with both sagittal and coronal reconstructions was performed. There is diffuse general as osteopenia. There is a moderate dorsal kyphosis. The paravertebral and soft tissues are  unremarkable. The disk spaces are intact. There is multilevel degenerative changes with bridging osteophytes No acute fractures or spondylo-listhesis. NO ACUTE FRACTURES. All CT scans at this facility use dose modulation, iterative reconstruction, and/or weight based dosing when appropriate to reduce radiation dose to as low as reasonably achievable. Examination: CT scan lumbar spine CLINICAL DATA: Pain, fall TECHNIQUE: Multiple serial axial images of the lumbar spine from the lower thoracic through the sacral/coccygeal levels with both sagittal coronal reconstruction was performed FINDINGS: There are 5 lumbar type vertebra. Is a diffuse generalized osteopenia. The disc spaces are narrowed at the L1-2 and L5-S1 disc space. . There is a grade 1 anterolisthesis of L4 and L5. Mohsen Valverde There is multilevel degenerative changes of posterior facets of lower lumbar spine The SI joints are intact. Approximately 20% loss of height of the L2 vertebra. Likely old No new acute fractures. IMPRESSION: NO ACUTE FRACTURES GRADE 1 ANTEROLISTHESIS OF L4 on L5    Xr Shoulder Left (min 2 Views)    Result Date: 1/18/2021  EXAM: XR SHOULDER LEFT (MIN 2 VIEWS) HISTORY: Shoulder pain COMPARISON: None available TECHNIQUE: AP, Grashey, and a scapular Y view of the shoulder obtained. FINDINGS: No acute fracture or dislocation. Degenerative changes of the glenohumeral joint including joint space loss and marginal osteophyte formation. Carotid vascular stent graft is noted. Soft tissues are within normal limits. No acute osseous abnormality.        Discharge Medications:       Carmen Chow   [place] Medication Instructions [facility]:[id number]    Printed on:01/19/21 1249   Medication Information                      citalopram (CELEXA) 20 MG tablet  Take 20 mg by mouth daily             clopidogrel (PLAVIX) 75 MG tablet  Take 75 mg by mouth daily             hydrALAZINE (APRESOLINE) 25 MG tablet  Take 25 mg by mouth 2 times daily 50 MG in AM, 25 MG in PM             hydroCHLOROthiazide (HYDRODIURIL) 25 MG tablet  Take 25 mg by mouth daily             metoprolol tartrate (LOPRESSOR) 50 MG tablet TAKE 1/2 TABLET IN THE MORNING AND 1 TABLET AT NIGHT             potassium chloride (KLOR-CON M) 20 MEQ extended release tablet  Take 20 mEq by mouth daily             simvastatin (ZOCOR) 20 MG tablet  Take 20 mg by mouth nightly                 Disposition:   Discharged to Home. Any [facility] needs that were indicated and/or required as been addressed and set up by Social Work. Condition at discharge: Pt was medically stable at the time of discharge. Significant improvement in clinical condition compared to initial condition at presentation to hospital    Activity: activity as tolerated, fall precautions. Total time taken for discharging this patient: 40 minutes. Greater than 70% of time was spent focused exclusively on this patient. Time was taken to review chart, discuss plans with consultants, reconciling medications, discussing plan answering questions with patient. SignedZelphia Spear  1/19/2021, 12:45 PM  ----------------------------------------------------------------------------------------------------------------------    Atul Crowell,     Please return to ER or call 911 if you develop any significant signs or symptoms.     I may not have addressed all of your medical illnesses or the abnormal blood work or imaging therefore please ask your PCP, Jasmine Carlos MD ,  to obtain [id number] [facility] record to follow up on all of the abnormal labs, imaging and findings that I have and have not addressed during your hospitalization.      Discharging you from the hospital does not mean that your medical care ends here and now.  You may still need additional work up, investigation, monitoring, and treatment to be handled from this point on by outside providers including your PCP, Jasmine Carlos MD , Specialists and other healthcare providers.  Tiffanie Calderon Please review your list of discharge medications prior to resuming medications you might still have at home, as the medications you need to be taking, dosages or how often you must take them may have changed. For medication questions, contact your retail pharmacy and your PCP, Kristin Connelly MD .     ** I STRONGLY RECOMMEND that you follow up with Kristin Connelly MD within 3 to 5 days for a post hospitalization evaluation. This specific office visit is covered by your insurance, and is not the same as your annual doctor visit/ check up. This office visit is important, as it may prevent need for repeat and/or future hospitalizations. **    Your medical team at [facility] ([facility]) appreciates the opportunity to work with you to get well!     Sincerely,  Genevieve Marcelo

## 2021-01-19 NOTE — PROGRESS NOTES
Physician Progress Note      Christ Velazquez  CSN #:                  [id number]  :                       8/15/1930  ADMIT DATE:       2021 5:51 PM  [facility] DATE:  RESPONDING  PROVIDER #:        Rosetta Capone MD          QUERY TEXT:    Pt admitted with unwitnessed fall with unknown LOC at home. Pt has history of   frequent falls at home with dementia, generalized osteoarthritis (DX 3/2004)   and inability to care for self. Pt noted to have left should OA, Alzheimer's   dementia and hyponatremia on admission. Noted to have Ortho eval, PT/OT order,   current evaluation for NH placement, rehab and/or home health care and   improvement of hydration to resolve hyponatremia ordered. If possible, please   document in progress notes and discharge summary the cause of the fall at   home:    The medical record reflects the following:  Risk Factors: TIA, Alzeheimer's dementia, HTN, DX with generalized   osteoarthritis in   Clinical Indicators: Na 132-134, //94; Baseline orientation is A &   Ox1-person; OT note: Memory: Decreased short term memory, Decreased recall of   recent events Safety Judgement: Decreased awareness of need for assistance,   Decreased awareness of need for safety Problem Solving: Assistance required to   generate solutions, Assistance required to identify errors made, Assistance   required to correct errors made, Assistance required to implement solutions   Insights: Decreased awareness of deficits.  Functional Mobility Comments: Pt   moved slowly with VCs for safety, \"furnti  ROM, Decreased endurance, Decreased strength, Decreased fine motor control,   Decreased balance  Treatment:  Ortho consult, PT/OT, CT head/chest,T&L spine/Abd; XR lt   shoulder/elbow, Apresoline, labetalol, Lopressor, Celexa, serial Na levels,   labs and monitoring  Options provided:  -- Fall due to age related debility  -- Fall due to age related cognitive debility -- Fall due to hyponatremia  -- Fall due to hypertensive emergency  -- Fall due to generalized osteoarthritis  -- Other - I will add my own diagnosis  -- Disagree - Not applicable / Not valid  -- Disagree - Clinically unable to determine / Unknown  -- Refer to Clinical Documentation Reviewer    PROVIDER RESPONSE TEXT:    This patient has due to age related debility.     Query created by: Teressa Hernandez on 1/19/2021 10:30 AM      Electronically signed by:  Bishop Mccullough MD 1/19/2021 4:15 PM

## 2021-01-19 NOTE — PROGRESS NOTES
Hospitalist Progress Note      PCP: Mick Myrick MD    Date of Admission: 1/17/2021    Chief Complaint:  No acute events, afebrile, on RA, feels better, able to lift her left arm today, eager to go home    Medications:  Reviewed    Infusion Medications   Scheduled Medications    sodium chloride flush  10 mL Intravenous 2 times per day    enoxaparin  40 mg Subcutaneous Daily    citalopram  20 mg Oral Daily    clopidogrel  75 mg Oral Daily    hydrALAZINE  25 mg Oral BID    hydroCHLOROthiazide  25 mg Oral Daily    metoprolol tartrate  25 mg Oral BID    atorvastatin  10 mg Oral Daily     PRN Meds: labetalol, sodium chloride flush, promethazine **OR** ondansetron, polyethylene glycol, acetaminophen **OR** acetaminophen, ketorolac, melatonin      Intake/Output Summary (Last 24 hours) at 1/19/2021 1236  Last data filed at 1/18/2021 1904  Gross per 24 hour   Intake 1370 ml   Output 600 ml   Net 770 ml       Exam:    BP (!) 156/69   Pulse 66   Temp 97.9 °F (36.6 °C) (Oral)   Resp 18   Ht 5' (1.524 m)   Wt 181 lb (82.1 kg)   SpO2 99%   BMI 35.35 kg/m²     General appearance: appears stated age and cooperative. Respiratory:  clear to auscultation bilaterally . Cardiovascular: Regular rate and rhythm, S1/S2. Abdomen: Soft, active bowel sounds. Musculoskeletal: limited ROM of the left upper extremity due to pain.         Labs:   Recent Labs     01/17/21  1830 01/18/21  0550 01/19/21  0610   WBC 9.0 9.1 6.6   HGB 13.9 12.3 11.9*   HCT 41.8 37.2 35.7*    157 141     Recent Labs     01/17/21  1830 01/18/21  0550 01/19/21  0610   * 134* 132*   K 4.8 4.1 4.1   CL 93* 97 99   CO2 26 25 23   BUN 18 18 17   CREATININE 0.85 0.94* 0.86   CALCIUM 9.6 8.8 8.7     Recent Labs     01/17/21  1830 01/18/21  0550 01/19/21  0610   AST 28 17 20   ALT 16 12 12   BILITOT 0.7 0.6 0.7   ALKPHOS 92 66 61     Recent Labs     01/17/21  1830   INR 0.9     Recent Labs     01/17/21  1830 01/18/21  0047 01/18/21  0550 CKTOTAL 164  --   --    TROPONINI <0.010 <0.010 <0.010       Urinalysis:      Lab Results   Component Value Date    NITRU Negative 01/17/2021    WBCUA 6-9 01/17/2021    BACTERIA Negative 01/17/2021    RBCUA 6-10 01/17/2021    BLOODU TRACE 01/17/2021    SPECGRAV 1.039 01/17/2021    GLUCOSEU Negative 01/17/2021       Radiology:  XR SHOULDER LEFT (MIN 2 VIEWS)   Final Result      No acute osseous abnormality. XR ELBOW LEFT (MIN 3 VIEWS)   Final Result      No acute osseous abnormality. CT HEAD WO CONTRAST   Final Result      NO ACUTE INTRA-AXIAL OR EXTRA-AXIAL FINDINGS. All CT scans at this facility use dose modulation, iterative reconstruction, and/or weight based dosing when appropriate to reduce radiation dose to as low as reasonably achievable. CT CERVICAL SPINE WO CONTRAST   Final Result      CT THORACIC SPINE WO CONTRAST   Final Result      NO ACUTE FRACTURES. All CT scans at this facility use dose modulation, iterative reconstruction, and/or weight based dosing when appropriate to reduce radiation dose to as low as reasonably achievable. Examination: CT scan lumbar spine      CLINICAL DATA: Pain, fall      TECHNIQUE: Multiple serial axial images of the lumbar spine from the lower thoracic through the sacral/coccygeal levels with both sagittal coronal reconstruction was performed      FINDINGS:   There are 5 lumbar type vertebra. Is a diffuse generalized osteopenia. The disc spaces are narrowed at the L1-2 and L5-S1 disc space. .   There is a grade 1 anterolisthesis of L4 and L5. John Savant There is multilevel degenerative changes of posterior facets of lower lumbar spine      The SI joints are intact. Approximately 20% loss of height of the L2 vertebra. Likely old   No new acute fractures. IMPRESSION:      NO ACUTE FRACTURES   GRADE 1 ANTEROLISTHESIS OF L4 on L5      CT LUMBAR SPINE WO CONTRAST   Final Result      NO ACUTE FRACTURES. All CT scans at this facility use dose modulation, iterative reconstruction, and/or weight based dosing when appropriate to reduce radiation dose to as low as reasonably achievable. Examination: CT scan lumbar spine      CLINICAL DATA: Pain, fall      TECHNIQUE: Multiple serial axial images of the lumbar spine from the lower thoracic through the sacral/coccygeal levels with both sagittal coronal reconstruction was performed      FINDINGS:   There are 5 lumbar type vertebra. Is a diffuse generalized osteopenia. The disc spaces are narrowed at the L1-2 and L5-S1 disc space. .   There is a grade 1 anterolisthesis of L4 and L5. Trude Roers There is multilevel degenerative changes of posterior facets of lower lumbar spine      The SI joints are intact. Approximately 20% loss of height of the L2 vertebra. Likely old   No new acute fractures. IMPRESSION:      NO ACUTE FRACTURES   GRADE 1 ANTEROLISTHESIS OF L4 on L5      CT CHEST W CONTRAST   Final Result   UNREMARKABLE CT SCAN THE CHEST AS DESCRIBED ABOVE         All CT scans at this facility use dose modulation, iterative reconstruction, and/or weight based dosing when appropriate to reduce radiation dose to as low as reasonably achievable. Examination: CT CHEST W CONTRAST      Indication:   Unwitnessed fall with unknown loss of consciousness, left shoulder pain discomfort, left chest pain near shoulder       Technique: Multiple serial axial images was performed through the abdomen and pelvis without intravenous or oral administration of contrast Images were reconstructed in the axial and coronal and sagittal planes. Comparison:  No comparison is available. Findings: The liver, gallbladder, spleen, pancreas, adrenals, are unremarkable. The right kidney shows no significant perinephric stranding. No hydronephrosis. The left kidney is atrophic. There is an area of low-attenuation at the inferior pole too small to characterize. No hydronephrosis. No bladder calculi. Large and small bowel show no sign of obstruction. The appendix is visualized. No periappendiceal stranding. No diverticulitis. There is a surgically absent. No free air. No free fluid. There is a infrarenal abdominal aortic aneurysm measuring 3.5 x 3.2 cm. No significant retroperitoneal adenopathy. There Is a diffuse generalized osteopenia as well as loss of height of approximately 20% of the L2 vertebra. No acute fractures. Note is made of grade 1 anterolisthesis of L4 and L5. The SI joints are intact. Visualized portions of the left and right hips are intact      Incidental note is made of a tubular structure in between the thighs the perineum. Correlate with patient history. .      IMPRESSION:   1. THERE IS A INFRARENAL ABDOMINAL AORTIC ANEURYSM.   2 OTHER FINDINGS DETAILED ABOVE         All CT scans at this facility use dose modulation, iterative reconstruction, and/or weight based dosing when appropriate to reduce radiation dose to as low as reasonably achievable. CT ABDOMEN PELVIS W IV CONTRAST Additional Contrast? None    (Results Pending)           Assessment/Plan:    80 y.o. female with a history of hypertension, TIA, falls who presented with:     Falls with LUE pain and limited ROM  - likely due to exacerbation of left shoulder arthritis with contusion   - trauma w/u obtained in ED was negative for acute findings  - conservative therapy per Ortho  - continue PT/OT    Hypertensive urgency  - with SBP in the 200s due to severe pain on arrival requiring IV Labetalol  - improved  - continue home meds    Hyponatremia   - mild, monitor      Disposition - home with [facility]                  Electronically signed by Marci Wallace MD on 1/19/2021 at 12:36 PM

## 2023-11-14 ENCOUNTER — APPOINTMENT (OUTPATIENT)
Dept: CT IMAGING | Age: 88
End: 2023-11-14
Payer: MEDICARE

## 2023-11-14 ENCOUNTER — HOSPITAL ENCOUNTER (EMERGENCY)
Age: 88
Discharge: HOME | End: 2023-11-14
Attending: EMERGENCY MEDICINE
Payer: MEDICARE

## 2023-11-14 VITALS
BODY MASS INDEX: 34.96 KG/M2 | SYSTOLIC BLOOD PRESSURE: 134 MMHG | OXYGEN SATURATION: 94 % | WEIGHT: 179 LBS | DIASTOLIC BLOOD PRESSURE: 83 MMHG | HEART RATE: 78 BPM | RESPIRATION RATE: 16 BRPM | TEMPERATURE: 97.8 F

## 2023-11-14 DIAGNOSIS — R10.30 LOWER ABDOMINAL PAIN: Primary | ICD-10-CM

## 2023-11-14 DIAGNOSIS — I71.43 INFRARENAL ABDOMINAL AORTIC ANEURYSM (AAA) WITHOUT RUPTURE (HCC): ICD-10-CM

## 2023-11-14 LAB
ALBUMIN SERPL-MCNC: 4.3 G/DL (ref 3.5–4.6)
ALP SERPL-CCNC: 105 U/L (ref 40–130)
ALT SERPL-CCNC: 29 U/L (ref 0–33)
ANION GAP SERPL CALCULATED.3IONS-SCNC: 11 MEQ/L (ref 9–15)
AST SERPL-CCNC: 42 U/L (ref 0–35)
BACTERIA URNS QL MICRO: ABNORMAL /HPF
BASOPHILS # BLD: 0.1 K/UL (ref 0–0.1)
BASOPHILS NFR BLD: 0.7 % (ref 0.1–1.2)
BILIRUB SERPL-MCNC: 0.5 MG/DL (ref 0.2–0.7)
BILIRUB UR QL STRIP: NEGATIVE
BUN SERPL-MCNC: 27 MG/DL (ref 8–23)
CALCIUM SERPL-MCNC: 9.8 MG/DL (ref 8.5–9.9)
CHLORIDE SERPL-SCNC: 96 MEQ/L (ref 95–107)
CLARITY UR: CLEAR
CO2 SERPL-SCNC: 28 MEQ/L (ref 20–31)
COLOR UR: YELLOW
CREAT SERPL-MCNC: 0.96 MG/DL (ref 0.5–0.9)
EKG ATRIAL RATE: 60 BPM
EKG Q-T INTERVAL: 464 MS
EKG QRS DURATION: 90 MS
EKG QTC CALCULATION (BAZETT): 474 MS
EKG R AXIS: -59 DEGREES
EKG T AXIS: -2 DEGREES
EKG VENTRICULAR RATE: 63 BPM
EOSINOPHIL # BLD: 0.3 K/UL (ref 0–0.4)
EOSINOPHIL NFR BLD: 4 % (ref 0.7–5.8)
EPI CELLS #/AREA URNS HPF: ABNORMAL /HPF
ERYTHROCYTE [DISTWIDTH] IN BLOOD BY AUTOMATED COUNT: 13.5 % (ref 11.7–14.4)
GLOBULIN SER CALC-MCNC: 2.8 G/DL (ref 2.3–3.5)
GLUCOSE SERPL-MCNC: 103 MG/DL (ref 70–99)
GLUCOSE UR STRIP-MCNC: NEGATIVE MG/DL
HCT VFR BLD AUTO: 39.8 % (ref 37–47)
HGB BLD-MCNC: 13.1 G/DL (ref 11.2–15.7)
HGB UR QL STRIP: NEGATIVE
IMM GRANULOCYTES # BLD: 0 K/UL
IMM GRANULOCYTES NFR BLD: 0.3 %
INR PPP: 1
KETONES UR STRIP-MCNC: NEGATIVE MG/DL
LACTATE BLDV-SCNC: 0.8 MMOL/L (ref 0.5–2.2)
LEUKOCYTE ESTERASE UR QL STRIP: ABNORMAL
LIPASE SERPL-CCNC: 67 U/L (ref 12–95)
LYMPHOCYTES # BLD: 2.1 K/UL (ref 1.2–3.7)
LYMPHOCYTES NFR BLD: 30.5 %
MAGNESIUM SERPL-MCNC: 1.9 MG/DL (ref 1.7–2.4)
MCH RBC QN AUTO: 33.4 PG (ref 25.6–32.2)
MCHC RBC AUTO-ENTMCNC: 32.9 % (ref 32.2–35.5)
MCV RBC AUTO: 101.5 FL (ref 79.4–94.8)
MONOCYTES # BLD: 0.5 K/UL (ref 0.2–0.9)
MONOCYTES NFR BLD: 7.9 % (ref 4.7–12.5)
NEUTROPHILS # BLD: 3.9 K/UL (ref 1.6–6.1)
NEUTS SEG NFR BLD: 56.6 % (ref 34–71.1)
NITRITE UR QL STRIP: NEGATIVE
PH UR STRIP: 7 [PH] (ref 5–9)
PLATELET # BLD AUTO: 163 K/UL (ref 182–369)
POTASSIUM SERPL-SCNC: 4.8 MEQ/L (ref 3.4–4.9)
PROT SERPL-MCNC: 7.1 G/DL (ref 6.3–8)
PROT UR STRIP-MCNC: 30 MG/DL
PROTHROMBIN TIME: 13.6 SEC (ref 12.3–14.9)
RBC # BLD AUTO: 3.92 M/UL (ref 3.93–5.22)
RBC #/AREA URNS HPF: ABNORMAL /HPF (ref 0–2)
SODIUM SERPL-SCNC: 135 MEQ/L (ref 135–144)
SP GR UR STRIP: 1.02 (ref 1–1.03)
TROPONIN, HIGH SENSITIVITY: 26 NG/L (ref 0–19)
TROPONIN, HIGH SENSITIVITY: 26 NG/L (ref 0–19)
UROBILINOGEN UR STRIP-ACNC: 1 E.U./DL
WBC # BLD AUTO: 6.8 K/UL (ref 4–10)
WBC #/AREA URNS HPF: ABNORMAL /HPF (ref 0–5)

## 2023-11-14 PROCEDURE — 96374 THER/PROPH/DIAG INJ IV PUSH: CPT

## 2023-11-14 PROCEDURE — 84484 ASSAY OF TROPONIN QUANT: CPT

## 2023-11-14 PROCEDURE — 96361 HYDRATE IV INFUSION ADD-ON: CPT

## 2023-11-14 PROCEDURE — 2580000003 HC RX 258: Performed by: EMERGENCY MEDICINE

## 2023-11-14 PROCEDURE — 93010 ELECTROCARDIOGRAM REPORT: CPT | Performed by: INTERNAL MEDICINE

## 2023-11-14 PROCEDURE — 99285 EMERGENCY DEPT VISIT HI MDM: CPT

## 2023-11-14 PROCEDURE — 74177 CT ABD & PELVIS W/CONTRAST: CPT

## 2023-11-14 PROCEDURE — 83605 ASSAY OF LACTIC ACID: CPT

## 2023-11-14 PROCEDURE — 83735 ASSAY OF MAGNESIUM: CPT

## 2023-11-14 PROCEDURE — 81001 URINALYSIS AUTO W/SCOPE: CPT

## 2023-11-14 PROCEDURE — 93005 ELECTROCARDIOGRAM TRACING: CPT

## 2023-11-14 PROCEDURE — 85025 COMPLETE CBC W/AUTO DIFF WBC: CPT

## 2023-11-14 PROCEDURE — 80053 COMPREHEN METABOLIC PANEL: CPT

## 2023-11-14 PROCEDURE — 36415 COLL VENOUS BLD VENIPUNCTURE: CPT

## 2023-11-14 PROCEDURE — 83690 ASSAY OF LIPASE: CPT

## 2023-11-14 PROCEDURE — 6360000002 HC RX W HCPCS: Performed by: EMERGENCY MEDICINE

## 2023-11-14 PROCEDURE — 87040 BLOOD CULTURE FOR BACTERIA: CPT

## 2023-11-14 PROCEDURE — 6360000004 HC RX CONTRAST MEDICATION: Performed by: EMERGENCY MEDICINE

## 2023-11-14 PROCEDURE — 85610 PROTHROMBIN TIME: CPT

## 2023-11-14 PROCEDURE — 96375 TX/PRO/DX INJ NEW DRUG ADDON: CPT

## 2023-11-14 RX ORDER — MORPHINE SULFATE 4 MG/ML
4 INJECTION, SOLUTION INTRAMUSCULAR; INTRAVENOUS
Status: COMPLETED | OUTPATIENT
Start: 2023-11-14 | End: 2023-11-14

## 2023-11-14 RX ORDER — OXYCODONE HYDROCHLORIDE AND ACETAMINOPHEN 5; 325 MG/1; MG/1
1-2 TABLET ORAL EVERY 6 HOURS PRN
Qty: 30 TABLET | Refills: 0 | Status: SHIPPED | OUTPATIENT
Start: 2023-11-14 | End: 2023-11-21

## 2023-11-14 RX ORDER — LEVOTHYROXINE SODIUM 88 UG/1
88 TABLET ORAL DAILY
COMMUNITY

## 2023-11-14 RX ORDER — ACETAMINOPHEN 500 MG
500 TABLET ORAL 4 TIMES DAILY PRN
Qty: 50 TABLET | Refills: 0 | Status: SHIPPED | OUTPATIENT
Start: 2023-11-14

## 2023-11-14 RX ORDER — ONDANSETRON 2 MG/ML
4 INJECTION INTRAMUSCULAR; INTRAVENOUS ONCE
Status: COMPLETED | OUTPATIENT
Start: 2023-11-14 | End: 2023-11-14

## 2023-11-14 RX ORDER — 0.9 % SODIUM CHLORIDE 0.9 %
1000 INTRAVENOUS SOLUTION INTRAVENOUS ONCE
Status: COMPLETED | OUTPATIENT
Start: 2023-11-14 | End: 2023-11-14

## 2023-11-14 RX ADMIN — IOPAMIDOL 75 ML: 755 INJECTION, SOLUTION INTRAVENOUS at 03:02

## 2023-11-14 RX ADMIN — MORPHINE SULFATE 4 MG: 4 INJECTION INTRAVENOUS at 02:30

## 2023-11-14 RX ADMIN — ONDANSETRON 4 MG: 2 INJECTION INTRAMUSCULAR; INTRAVENOUS at 02:30

## 2023-11-14 RX ADMIN — SODIUM CHLORIDE 1000 ML: 9 INJECTION, SOLUTION INTRAVENOUS at 02:29

## 2023-11-14 ASSESSMENT — ENCOUNTER SYMPTOMS
COUGH: 0
SINUS PRESSURE: 0
WHEEZING: 0
EYE PAIN: 0
DIARRHEA: 0
APNEA: 0
RHINORRHEA: 0
ABDOMINAL DISTENTION: 0
NAUSEA: 0
VOMITING: 0
ABDOMINAL PAIN: 1
BACK PAIN: 0
SORE THROAT: 0
SHORTNESS OF BREATH: 0
PHOTOPHOBIA: 0
CONSTIPATION: 0
COLOR CHANGE: 0

## 2023-11-19 LAB
BACTERIA BLD CULT ORG #2: NORMAL
BACTERIA BLD CULT: NORMAL